# Patient Record
Sex: MALE | Race: WHITE | NOT HISPANIC OR LATINO | Employment: FULL TIME | URBAN - METROPOLITAN AREA
[De-identification: names, ages, dates, MRNs, and addresses within clinical notes are randomized per-mention and may not be internally consistent; named-entity substitution may affect disease eponyms.]

---

## 2019-11-12 NOTE — PROGRESS NOTES
"Subjective     Drew Ennis is a 34 year old male who presents to clinic today for the following health issues:    History of Present Illness        Back Pain:  He presents for follow up of back pain. Patient's back pain is a recurring problem.  Location of back pain:  Right lower back, right buttock and right hip  Description of back pain: burning, cramping, dull ache, shooting and stabbing  Back pain spreads: right buttocks, right thigh, right knee and right foot    Since patient first noticed back pain, pain is: gradually worsening  Does back pain interfere with his job:  Yes      He eats 0-1 servings of fruits and vegetables daily.He consumes 1 sweetened beverage(s) daily.  He is taking medications regularly.     Back Pain       Duration: 3 weeks. Been having \"normal back pain\" for a few months        Specific cause: none    Description:   Location of pain: low back right  Character of pain: sharp, dull ache, stabbing, burning, cramping and constant  Pain radiation:radiates into the right buttocks, radiates into the right leg and radiates into the right foot  New numbness or weakness in legs, not attributed to pain:  YES- \"alittle bit\"    Intensity: Currently 5/10, At its worst 9/10    History:   Pain interferes with job: YES  History of back problems: 5-6 years ago this happened (just not as bad) was able to get it to go away with chiropractor care.   Any previous MRI or X-rays: None  Sees a specialist for back pain:  No  Therapies tried without relief: stretch    Alleviating factors:   Improved by: acetaminophen (Tylenol), chiropractor, cold and heat      Precipitating factors:  Worsened by: Standing and Sitting        Accompanying Signs & Symptoms:  Risk of Fracture:  None  Risk of Cauda Equina:  None  Risk of Infection:  None  Risk of Cancer:  None  Risk of Ankylosing Spondylitis:  Onset at age <35, male, AND morning back stiffness. no      Patient Active Problem List   Diagnosis     Morbid obesity (H)     " History reviewed. No pertinent surgical history.    Social History     Tobacco Use     Smoking status: Never Smoker     Smokeless tobacco: Never Used   Substance Use Topics     Alcohol use: Not Currently     Family History   Problem Relation Age of Onset     Diabetes Father      Diabetes Paternal Grandfather          Current Outpatient Medications   Medication Sig Dispense Refill     cyclobenzaprine (FLEXERIL) 10 MG tablet Take 1 tablet (10 mg) by mouth 3 times daily as needed for muscle spasms 30 tablet 0     naproxen (NAPROSYN) 500 MG tablet Take 1 tablet (500 mg) by mouth 2 times daily (with meals) 30 tablet 0     No Known Allergies  No lab results found.   BP Readings from Last 3 Encounters:   11/13/19 136/86    Wt Readings from Last 3 Encounters:   11/13/19 137.5 kg (303 lb 1.6 oz)              Reviewed and updated as needed this visit by Provider         Review of Systems   ROS COMP: Constitutional, HEENT, cardiovascular, pulmonary, gi and gu systems are negative, except as otherwise noted.      Objective    There were no vitals taken for this visit.  There is no height or weight on file to calculate BMI.  Physical Exam   GENERAL: healthy, alert and no distress  RESP: lungs clear to auscultation - no rales, rhonchi or wheezes  CV: regular rate and rhythm, normal S1 S2, no S3 or S4, no murmur, click or rub, no peripheral edema and peripheral pulses strong  MS: no gross musculoskeletal defects noted, no edema  NEURO: Normal strength and tone, mentation intact and speech normal  BACK: no CVA tenderness, no paralumbar tenderness  Comprehensive back pain exam:  No tenderness, Pain limits the following motions: Forward flexion, Lower extremity strength functional and equal on both sides, Lower extremity reflexes within normal limits bilaterally, Lower extremity sensation normal and equal on both sides and Straight leg positive on  right, indicating possible ipsilateral radiculopathy  PSYCH: mentation appears normal,  affect normal/bright    Diagnostic Test Results:  Labs reviewed in Epic        ASSESSMENT and PLAN  1. Acute right-sided low back pain with right-sided sciatica  34-year-old male with acute on chronic low back pain, now with radicular symptoms on the right side.  Positive straight leg raise.  Discussed treatment options.  We will start with naproxen and Flexeril, at home exercises.  He will follow-up if no improvement.  Consider MRI and physical therapy consult at that time.  - XR Lumbar Spine 2/3 Views  - naproxen (NAPROSYN) 500 MG tablet; Take 1 tablet (500 mg) by mouth 2 times daily (with meals)  Dispense: 30 tablet; Refill: 0  - cyclobenzaprine (FLEXERIL) 10 MG tablet; Take 1 tablet (10 mg) by mouth 3 times daily as needed for muscle spasms  Dispense: 30 tablet; Refill: 0    2. Morbid obesity (H)  He is due for his annual exam, scheduled for next week.  We will address his obesity at that time.    Return in about 1 week (around 11/20/2019) for Annual Well Check.     Francisco Bello MD, FAAFP  Family Medicine Physician  [facility]  [address] [place] [address]

## 2019-11-13 ENCOUNTER — OFFICE VISIT (OUTPATIENT)
Dept: FAMILY MEDICINE | Facility: CLINIC | Age: 34
End: 2019-11-13
Payer: COMMERCIAL

## 2019-11-13 ENCOUNTER — ANCILLARY PROCEDURE (OUTPATIENT)
Dept: GENERAL RADIOLOGY | Facility: CLINIC | Age: 34
End: 2019-11-13
Attending: FAMILY MEDICINE
Payer: COMMERCIAL

## 2019-11-13 VITALS
BODY MASS INDEX: 42.43 KG/M2 | OXYGEN SATURATION: 96 % | HEIGHT: 71 IN | TEMPERATURE: 98.8 F | HEART RATE: 103 BPM | DIASTOLIC BLOOD PRESSURE: 86 MMHG | RESPIRATION RATE: 14 BRPM | WEIGHT: 303.1 LBS | SYSTOLIC BLOOD PRESSURE: 136 MMHG

## 2019-11-13 DIAGNOSIS — E66.01 MORBID OBESITY (H): ICD-10-CM

## 2019-11-13 DIAGNOSIS — M54.41 ACUTE RIGHT-SIDED LOW BACK PAIN WITH RIGHT-SIDED SCIATICA: Primary | ICD-10-CM

## 2019-11-13 PROCEDURE — 72100 X-RAY EXAM L-S SPINE 2/3 VWS: CPT | Mod: FY

## 2019-11-13 PROCEDURE — 99203 OFFICE O/P NEW LOW 30 MIN: CPT | Performed by: FAMILY MEDICINE

## 2019-11-13 RX ORDER — NAPROXEN 500 MG/1
500 TABLET ORAL 2 TIMES DAILY WITH MEALS
Qty: 30 TABLET | Refills: 0 | Status: ON HOLD | OUTPATIENT
Start: 2019-11-13 | End: 2019-12-19

## 2019-11-13 RX ORDER — CYCLOBENZAPRINE HCL 10 MG
10 TABLET ORAL 3 TIMES DAILY PRN
Qty: 30 TABLET | Refills: 0 | Status: ON HOLD | OUTPATIENT
Start: 2019-11-13 | End: 2019-12-19

## 2019-11-13 ASSESSMENT — PAIN SCALES - GENERAL: PAINLEVEL: MODERATE PAIN (5)

## 2019-11-13 ASSESSMENT — MIFFLIN-ST. JEOR: SCORE: 2354.23

## 2019-11-13 NOTE — PATIENT INSTRUCTIONS
Drew,    It was great seeing you in clinic today.  I summarized our discussion and your plan below.  Please let me know if you have any questions or concerns.  Please follow up with me as discussed in clinic or sooner if any worsening or additional concerns.     1. Acute right-sided low back pain with right-sided sciatica  34-year-old male with acute on chronic low back pain, now with radicular symptoms on the right side.  Positive straight leg raise.  Discussed treatment options.  We will start with naproxen and Flexeril, at home exercises.  He will follow-up if no improvement.  Consider MRI and physical therapy consult at that time.  - XR Lumbar Spine 2/3 Views  - naproxen (NAPROSYN) 500 MG tablet; Take 1 tablet (500 mg) by mouth 2 times daily (with meals)  Dispense: 30 tablet; Refill: 0  - cyclobenzaprine (FLEXERIL) 10 MG tablet; Take 1 tablet (10 mg) by mouth 3 times daily as needed for muscle spasms  Dispense: 30 tablet; Refill: 0    2. Morbid obesity (H)  He is due for his annual exam, scheduled for next week.  We will address his obesity at that time.       Sincerely,  Dr. SWATHI Bello MD, [facility]  Family Medicine Physician  [facility]  [id number] [place] [id number]    Patient Education      Patient Education     Exercises to Strengthen Your Lower Back  Strong lower back and abdominal muscles work together to support your spine. The exercises below will help strengthen the lower back. It is important that you begin exercising slowly and increase levels gradually.  Always begin any exercise program with stretching. If you feel pain while doing any of these exercises, stop and talk to your doctor about a more specific exercise program that better suits your condition.   Low back stretch  The point of stretching is to make you more flexible and increase your range of motion. Stretch only as much as you are able. Stretch slowly. Do not push your stretch to the limit. If at any  point you feel pain while stretching, this is your (temporary) limit.    Lie on your back with your knees bent and both feet on the ground.    Slowly raise your left knee to your chest as you flatten your lower back against the floor. Hold for 5 seconds.    Relax and repeat the exercise with your right knee.    Do 10 of these exercises for each leg.    Repeat hugging both knees to your chest at the same time.  Building lower back strength  Start your exercise routine with 10 to 30 minutes a day, 1 to 3 times a day.  Initial exercises  Lying on your back:  1. Ankle pumps: Move your foot up and down, towards your head, and then away. Repeat 10 times with each foot.  2. Heel slides: Slowly bend your knee, drawing the heel of your foot towards you. Then slide your heel/foot from you, straightening your knee. Do not lift your foot off the floor (this is not a leg lift).  3. Abdominal contraction: Bend your knees and put your hands on your stomach. Tighten your stomach muscles. Hold for 5 seconds, then relax. Repeat 10 times.  4. Straight leg raise: Bend one leg at the knee and keep the other leg straight. Tighten your stomach muscles. Slowly lift your straight leg 6 to 12 inches off the floor and hold for up to 5 seconds. Repeat 10 times on each side.  Standin. Wall squats: Stand with your back against the wall. Move your feet about 12 inches away from the wall. Tighten your stomach muscles, and slowly bend your knees until they are at about a 45 degree angle. Do not go down too far. Hold about 5 seconds. Then slowly return to your starting position. Repeat 10 times.  2. Heel raises: Stand facing the wall. Slowly raise the heels of your feet up and down, while keeping your toes on the floor. If you have trouble balancing, you can touch the wall with your hands. Repeat 10 times.  More advanced exercises  When you feel comfortable enough, try these exercises.  1. Kneeling lumbar extension: Begin on your hands and  knees. At the same time, raise and straighten your right arm and left leg until they are parallel to the ground. Hold for 2 seconds and come back slowly to a starting position. Repeat with left arm and right leg, alternating 10 times.  2. Prone lumbar extension: Lie face down, arms extended overhead, palms on the floor. At the same time, raise your right arm and left leg as high as comfortably possible. Hold for 10 seconds and slowly return to start. Repeat with left arm and right leg, alternating 10 times. Gradually build up to 20 times. (Advanced: Repeat this exercise raising both arms and both legs a few inches off the floor at the same time. Hold for 5 seconds and release.)  3. Pelvic tilt: Lie on the floor on your back with your knees bent at 90 degrees. Your feet should be flat on the floor. Inhale, exhale, then slowly contract your abdominal muscles bringing your navel toward your spine. Let your pelvis rock back until your lower back is flat on the floor. Hold for 10 seconds while breathing smoothly.  4. Abdominal crunch: Perform a pelvic tilt (above) flattening your lower back against the floor. Holding the tension in your abdominal muscles, take another breath and raise your shoulder blades off the ground (this is not a full sit-up). Keep your head in line with your body (don t bend your neck forward). Hold for 2 seconds, then slowly lower.  Date Last Reviewed: 6/1/2016 2000-2018 [employer]. [address] [place] [address]. All rights reserved. This information is not intended as a substitute for professional medical care. Always follow your healthcare professional's instructions.           Patient Education     Possible Causes of Low Back or Leg Pain    BIG: The symptoms in your back or leg may be due to pressure on a nerve. This pressure may be caused by a damaged disk or by abnormal bone growth. Either way, you may feel pain, burning, tingling, or numbness. If you have  pressure on a nerve that connects to the sciatic nerve, pain may shoot down your leg.    Pressure from the disk  Constant wear and tear can weaken a disk over time and cause back pain. The disk can then be damaged by a sudden movement or injury. If its soft center starts to bulge, the disk may press on a nerve. Or the outside of the disk may tear, and the soft center may squeeze through and pinch a nerve.    Pressure from bone  As a disk wears out, the vertebrae right above and below the disk start to touch. This can put pressure on a nerve. Often, abnormal bone (called bone spurs) grows where the vertebrae rub against each other. This can cause the foramen or the spinal canal to narrow (called stenosis) and press against a nerve.  Date Last Reviewed: 3/1/2018    9226-4069 [employer]. [address] [place] [address]. All rights reserved. This information is not intended as a substitute for professional medical care. Always follow your healthcare professional's instructions.           Patient Education     Back Exercises: Abdominal Lift Brace with Marching    The abdominal lift brace with march strengthens your lower abdominal muscles, helping you keep your pelvis and back stable:    Lie on the floor with both knees bent. Put your feet flat on the floor and your arms by your sides. Tighten your abdominal muscles. Be sure to continue to breathe.    Lift one bent knee about 2 inches then return it to the floor and lift the other about 2 inches. Keep your abdominal muscles tight and continue to breathe. These motions should be slow and controlled without your pelvis rocking side to side.    Repeat 10 times.  Date Last Reviewed: 3/1/2018    7691-5976 [employer]. [address] [place] [address]. All rights reserved. This information is not intended as a substitute for professional medical care. Always follow your healthcare professional's instructions.           Patient  Education     Back Exercises: Arm Reach    Do this exercise on your hands and knees. Keep your knees under your hips and your hands under your shoulders. Keep your spine in a neutral position (not arched or sagging). Be sure to maintain your neck s natural curve:    Stretch one arm straight out in front of you. Don t raise your head or let your supporting shoulder sag.    Hold for 5 seconds.    Return to starting position.    Repeat 5 times.    Switch arms.  Date Last Reviewed: 3/1/2018    0122-4889 [employer]. [address] [place] [address]. All rights reserved. This information is not intended as a substitute for professional medical care. Always follow your healthcare professional's instructions.           Patient Education     Back Exercises: Back Release  Do this exercise on your hands and knees. Keep your knees under your hips and your hands under your shoulders.        Relax your abdominal and buttocks muscles, lift your head, and let your back sag. Be sure to keep your weight evenly distributed. Don t sit back on your hips.     Hold for 5 seconds.    Return to starting position.    Tuck your head and lift (arch) your back.    Hold for 5 seconds    Return to starting position.    Repeat 5 times.  Date Last Reviewed: 3/1/2018    0651-9866 [employer]. [address] [place] [address]. All rights reserved. This information is not intended as a substitute for professional medical care. Always follow your healthcare professional's instructions.

## 2019-11-18 ENCOUNTER — TELEPHONE (OUTPATIENT)
Dept: FAMILY MEDICINE | Facility: CLINIC | Age: 34
End: 2019-11-18

## 2019-11-18 DIAGNOSIS — M54.41 ACUTE RIGHT-SIDED LOW BACK PAIN WITH RIGHT-SIDED SCIATICA: Primary | ICD-10-CM

## 2019-11-18 RX ORDER — GABAPENTIN 300 MG/1
300 CAPSULE ORAL 2 TIMES DAILY
Qty: 30 CAPSULE | Refills: 0 | Status: SHIPPED | OUTPATIENT
Start: 2019-11-18 | End: 2019-12-02

## 2019-11-18 NOTE — LETTER
[facility]  [address]  [place] [address]  [phone]      November 22, 2019    Drew Ennis                                                                                                                     [address] [place] [address]      Dear Drew,    We have attempted to contact you by telephone without success.  Your Provider has a message for you:    Please advise patient that I am placing prescription for gabapentin.  This is a medication specific to nerve pain.  It can make him a little sleepy so ensure no driving while taking the medication. Also I have ordered an MRI to evaluate his lower back.  This can be scheduled by calling the [facility] in [place] [phone].  Please follow up with a clinic office visit with Dr. Bello after that.   We will consider physical therapy versus surgical options at that time.     Francisco Bello MD, FAAFP          Sincerely,     [facility] Support Staff / [place]

## 2019-11-18 NOTE — TELEPHONE ENCOUNTER
Reason for Call:  Medication or medication refill:    Do you use a [facility]?  Name of the pharmacy and phone number for the current request:  [email or web address]    Name of the medication requested: cyclobenzaprine    Other request: this is not working. Was told to call to discuss alternative.    Can we leave a detailed message on this number? YES    Phone number patient can be reached at: Home number on file [phone] (home)    Best Time: any    Call taken on 11/18/2019 at 10:16 AM by Karlie Lopez

## 2019-11-18 NOTE — TELEPHONE ENCOUNTER
Please advise patient that I am placing prescription for gabapentin.  This is a medication specific to nerve pain.  It can make him a little sleepy so ensure no driving while taking the medication.  Also I have ordered an MRI to evaluate his lower back, please assist with scheduling that.  Have him follow-up with me in clinic after that.  We will consider physical therapy versus surgical options at that time.    Francisco Bello MD, FAAFP  Family Medicine Physician  [facility]  [address] [place] [address]

## 2019-11-18 NOTE — TELEPHONE ENCOUNTER
"tried to call pt back.  Phone number states \" thank you for calling [place].  This number has not yet been assigned.\"    When pt calls back, please get working phone number and inform them of message below.  "

## 2019-11-19 ENCOUNTER — OFFICE VISIT (OUTPATIENT)
Dept: FAMILY MEDICINE | Facility: CLINIC | Age: 34
End: 2019-11-19
Payer: COMMERCIAL

## 2019-11-19 VITALS
SYSTOLIC BLOOD PRESSURE: 130 MMHG | HEIGHT: 71 IN | BODY MASS INDEX: 43.12 KG/M2 | WEIGHT: 308 LBS | OXYGEN SATURATION: 98 % | HEART RATE: 104 BPM | DIASTOLIC BLOOD PRESSURE: 74 MMHG | TEMPERATURE: 99 F | RESPIRATION RATE: 16 BRPM

## 2019-11-19 DIAGNOSIS — M54.41 ACUTE RIGHT-SIDED LOW BACK PAIN WITH RIGHT-SIDED SCIATICA: Primary | ICD-10-CM

## 2019-11-19 DIAGNOSIS — Z23 NEED FOR PROPHYLACTIC VACCINATION AND INOCULATION AGAINST INFLUENZA: ICD-10-CM

## 2019-11-19 PROCEDURE — 99214 OFFICE O/P EST MOD 30 MIN: CPT | Mod: 25 | Performed by: FAMILY MEDICINE

## 2019-11-19 PROCEDURE — 90686 IIV4 VACC NO PRSV 0.5 ML IM: CPT | Performed by: FAMILY MEDICINE

## 2019-11-19 PROCEDURE — 90471 IMMUNIZATION ADMIN: CPT | Performed by: FAMILY MEDICINE

## 2019-11-19 RX ORDER — METHYLPREDNISOLONE 4 MG
TABLET, DOSE PACK ORAL
Qty: 21 TABLET | Refills: 0 | Status: ON HOLD | OUTPATIENT
Start: 2019-11-19 | End: 2019-12-19

## 2019-11-19 ASSESSMENT — PAIN SCALES - GENERAL: PAINLEVEL: SEVERE PAIN (7)

## 2019-11-19 ASSESSMENT — MIFFLIN-ST. JEOR: SCORE: 2376.51

## 2019-11-19 NOTE — PROGRESS NOTES
Subjective     Drew Ennis is a 34 year old male who presents to clinic today for the following health issues:    HPI   Back Pain       Duration: 1 month now         Specific cause: none    Description:   Location of pain: low back right  Character of pain: sharp, stabbing, burning and constant  Pain radiation:radiates into the right leg and radiates into the right foot  New numbness or weakness in legs, not attributed to pain:  YES- numbness in the right foot     Intensity: Currently 7/10    History:   Pain interferes with job: YES  History of back problems: no prior back problems  Any previous MRI or X-rays: Yes- at [facility].  Date 11/13/19 at the last office visit   Sees a specialist for back pain:  No  Therapies tried without relief: muscle relaxants, gabapentin, some help with physical therapy    Alleviating factors:   Improved by: none       Precipitating factors:  Worsened by: Sitting and Lying Flat        Accompanying Signs & Symptoms:  Risk of Fracture:  None  Risk of Cauda Equina:  None  Risk of Infection:  None  Risk of Cancer:  None  Risk of Ankylosing Spondylitis:  Onset at age <35, male, AND morning back stiffness. no          Patient Active Problem List   Diagnosis     Morbid obesity (H)     History reviewed. No pertinent surgical history.    Social History     Tobacco Use     Smoking status: Never Smoker     Smokeless tobacco: Never Used   Substance Use Topics     Alcohol use: Not Currently     Family History   Problem Relation Age of Onset     Diabetes Father      Diabetes Paternal Grandfather          Current Outpatient Medications   Medication Sig Dispense Refill     cyclobenzaprine (FLEXERIL) 10 MG tablet Take 1 tablet (10 mg) by mouth 3 times daily as needed for muscle spasms 30 tablet 0     gabapentin (NEURONTIN) 300 MG capsule Take 1 capsule (300 mg) by mouth 2 times daily 30 capsule 0     methylPREDNISolone (MEDROL DOSEPAK) 4 MG tablet therapy pack Follow Package Directions 21 tablet 0      "naproxen (NAPROSYN) 500 MG tablet Take 1 tablet (500 mg) by mouth 2 times daily (with meals) 30 tablet 0     No Known Allergies  No lab results found.   BP Readings from Last 3 Encounters:   11/19/19 130/74   11/13/19 136/86    Wt Readings from Last 3 Encounters:   11/19/19 139.7 kg (308 lb)   11/13/19 137.5 kg (303 lb 1.6 oz)                    Reviewed and updated as needed this visit by Provider         Review of Systems   ROS COMP: Constitutional, HEENT, cardiovascular, pulmonary, gi and gu systems are negative, except as otherwise noted.      Objective    /74   Pulse 104   Temp 99  F (37.2  C) (Oral)   Resp 16   Ht 1.815 m (5' 11.46\")   Wt 139.7 kg (308 lb)   SpO2 98%   BMI 42.41 kg/m    Body mass index is 42.41 kg/m .  Physical Exam   GENERAL: healthy, alert and no distress  RESP: lungs clear to auscultation - no rales, rhonchi or wheezes  CV: regular rate and rhythm, normal S1 S2, no S3 or S4, no murmur, click or rub, no peripheral edema and peripheral pulses strong  MS: no gross musculoskeletal defects noted, no edema  Comprehensive back pain exam:  No tenderness, Pain limits the following motions: flexion, Lower extremity strength functional and equal on both sides, Lower extremity reflexes within normal limits bilaterally, Lower extremity sensation normal and equal on both sides and Straight leg positive on  right, indicating possible ipsilateral radiculopathy    Diagnostic Test Results:  Labs reviewed in Epic        ASSESSMENT and PLAN  1. Acute right-sided low back pain with right-sided sciatica  34-year-old male ongoing work-up for right-sided low back pain with right-sided sciatic symptoms.  He had no improvement with Flexeril.  Previously switch that to gabapentin, he has not picked that up yet.  Family member is a physical therapist who worked on him recently, recommend a Medrol Dosepak.  We discussed limitations of Medrol Dosepak and treatment of neuropathic pain, patient requested " Dosepak.  He will hold gabapentin until after that.  - methylPREDNISolone (MEDROL DOSEPAK) 4 MG tablet therapy pack; Follow Package Directions  Dispense: 21 tablet; Refill: 0    2. Need for prophylactic vaccination and inoculation against influenza  Due for influenza, conducted today.  - INFLUENZA VACCINE IM > 6 MONTHS VALENT IIV4 [49395]  - Vaccine Administration, Initial [39948]    No follow-ups on file.     Francisco Bello MD, FAAFP  Family Medicine Physician  [facility]  [address] [place] [address]

## 2019-11-19 NOTE — PATIENT INSTRUCTIONS
Drew,    It was great seeing you in clinic today.  I summarized our discussion and your plan below.  Please let me know if you have any questions or concerns.  Please follow up with me as discussed in clinic or sooner if any worsening or additional concerns.     1. Acute right-sided low back pain with right-sided sciatica  34-year-old male ongoing work-up for right-sided low back pain with right-sided sciatic symptoms.  He had no improvement with Flexeril.  Previously switch that to gabapentin, he has not picked that up yet.  Family member is a physical therapist who worked on him recently, recommend a Medrol Dosepak.  We discussed limitations of Medrol Dosepak and treatment of neuropathic pain, patient requested Dosepak.  He will hold gabapentin until after that.  - methylPREDNISolone (MEDROL DOSEPAK) 4 MG tablet therapy pack; Follow Package Directions  Dispense: 21 tablet; Refill: 0    2. Need for prophylactic vaccination and inoculation against influenza  Due for influenza, conducted today.  - INFLUENZA VACCINE IM > 6 MONTHS VALENT IIV4 [77325]  - Vaccine Administration, Initial [07297]       Sincerely,  Dr. SWATHI Bello MD, FAAFP  Family Medicine Physician  [facility]  [address] [place] [address]    Patient Education

## 2019-11-19 NOTE — TELEPHONE ENCOUNTER
"I tried calling patient today. Same as previous notes. When calling number on file it states \"thank you for calling [place] you have reached a number that has not been assigned please check your number and try again.\"    When patient calls in please advised message below and update patients phone number.    Sedrick Ayala MA on 11/19/2019 at 12:05 PM    "

## 2019-11-20 NOTE — TELEPHONE ENCOUNTER
Called pharmacy, pt number is [phone], updated chart. Tried to call pt but VM is full. If he calls back please give message below.     Roro Masters RN, BSN

## 2019-11-26 NOTE — PROGRESS NOTES
SUBJECTIVE:   CC: Drew Ennis is an 34 year old male who presents for preventative health visit.     Healthy Habits:     Getting at least 3 servings of Calcium per day:  NO    Bi-annual eye exam:  Yes    Dental care twice a year:  Yes    Sleep apnea or symptoms of sleep apnea:  None    Diet:  Regular (no restrictions)    Frequency of exercise:  2-3 days/week    Duration of exercise:  Less than 15 minutes    Taking medications regularly:  Yes    Medication side effects:  None    PHQ-2 Total Score: 0    Additional concerns today:  No          Chronic/Recurring Back Pain Follow Up      Where is your back pain located? (Select all that apply) low back bilateral    How would you describe your back pain?  burning, cramping and aching is the most common and gabapentin help with this    Where does your back pain spread? the right and left buttock, the right and left  thigh and the right and left foot    Since your last clinic visit for back pain, how has your pain changed? always present, but gets better and worse    Does your back pain interfere with your job? YES    Since your last visit, have you tried any new treatment? Yes -  stretching      Today's PHQ-2 Score:   PHQ-2 ( 1999 [employer]) 11/13/2019   Q1: Little interest or pleasure in doing things 0   Q2: Feeling down, depressed or hopeless 0   PHQ-2 Score 0   Q1: Little interest or pleasure in doing things Not at all   Q2: Feeling down, depressed or hopeless Not at all   PHQ-2 Score 0       Abuse: Current or Past(Physical, Sexual or Emotional)- No  Do you feel safe in your environment? Yes        Social History     Tobacco Use     Smoking status: Never Smoker     Smokeless tobacco: Never Used   Substance Use Topics     Alcohol use: Not Currently     If you drink alcohol do you typically have >3 drinks per day or >7 drinks per week? No    No flowsheet data found.    Last PSA: No results found for: PSA    Reviewed orders with patient. Reviewed health maintenance and  updated orders accordingly - Yes  Labs reviewed in EPIC  BP Readings from Last 3 Encounters:   12/02/19 132/82   11/19/19 130/74   11/13/19 136/86    Wt Readings from Last 3 Encounters:   12/02/19 135.5 kg (298 lb 11.2 oz)   11/19/19 139.7 kg (308 lb)   11/13/19 137.5 kg (303 lb 1.6 oz)                  Patient Active Problem List   Diagnosis     Morbid obesity (H)     History reviewed. No pertinent surgical history.    Social History     Tobacco Use     Smoking status: Never Smoker     Smokeless tobacco: Never Used   Substance Use Topics     Alcohol use: Not Currently     Family History   Problem Relation Age of Onset     Diabetes Father      Diabetes Paternal Grandfather          Current Outpatient Medications   Medication Sig Dispense Refill     cyclobenzaprine (FLEXERIL) 10 MG tablet Take 1 tablet (10 mg) by mouth 3 times daily as needed for muscle spasms 30 tablet 0     gabapentin (NEURONTIN) 300 MG capsule Take 1 capsule (300 mg) by mouth 3 times daily 60 capsule 1     methylPREDNISolone (MEDROL DOSEPAK) 4 MG tablet therapy pack Follow Package Directions 21 tablet 0     naproxen (NAPROSYN) 500 MG tablet Take 1 tablet (500 mg) by mouth 2 times daily (with meals) 30 tablet 0     No Known Allergies  No lab results found.     Reviewed and updated as needed this visit by clinical staff         Reviewed and updated as needed this visit by Provider        History reviewed. No pertinent past medical history.     Review of Systems   Constitutional: Negative for chills and fever.   HENT: Negative for congestion, ear pain, hearing loss and sore throat.    Eyes: Negative for pain and visual disturbance.   Respiratory: Negative for cough and shortness of breath.    Cardiovascular: Negative for chest pain, palpitations and peripheral edema.   Gastrointestinal: Negative for abdominal pain, constipation, diarrhea, heartburn, hematochezia and nausea.   Genitourinary: Negative for discharge, dysuria, frequency, genital sores,  hematuria, impotence and urgency.   Musculoskeletal: Negative for arthralgias, joint swelling and myalgias.   Skin: Negative for rash.   Neurological: Positive for headaches. Negative for dizziness, weakness and paresthesias.   Psychiatric/Behavioral: Negative for mood changes. The patient is not nervous/anxious.          OBJECTIVE:   There were no vitals taken for this visit.    Physical Exam  GENERAL: alert, no distress and obese  EYES: Eyes grossly normal to inspection, PERRL and conjunctivae and sclerae normal  HENT: ear canals and TM's normal, nose and mouth without ulcers or lesions  NECK: no adenopathy, no asymmetry, masses, or scars and thyroid normal to palpation  RESP: lungs clear to auscultation - no rales, rhonchi or wheezes  CV: regular rate and rhythm, normal S1 S2, no S3 or S4, no murmur, click or rub, no peripheral edema and peripheral pulses strong  ABDOMEN: soft, nontender, no hepatosplenomegaly, no masses and bowel sounds normal   (male): normal male genitalia without lesions or urethral discharge, no hernia  MS: no gross musculoskeletal defects noted, no edema  SKIN: no suspicious lesions or rashes  NEURO: Normal strength and tone, mentation intact and speech normal  PSYCH: mentation appears normal, affect normal/bright  Slowed range of motion secondary to right-sided low back pain.  Positive straight leg raise.  No significant worsening from last exam    Diagnostic Test Results:  Labs reviewed in Epic    ASSESSMENT/PLAN:   ASSESSMENT and PLAN  1. Routine general medical examination at a health care facility  34-year-old male here for an annual well exam.  We discussed current routine cancer screening guidelines.  Conducted a skin exam in clinic.  See below for other issues addressed.  Also getting routine labs today.  - HIV Screening  - Lipid panel reflex to direct LDL Non-fasting  - Glucose    2. Acute right-sided low back pain with right-sided sciatica  History of right-sided low back pain  "with radicular symptoms.  MRI showed some encroachment on the L5 nerve root.  He was referred for physical therapy, as appointment is in 4 days.  He has had some improvement with the gabapentin, recommend increasing up to 3 times daily.  Follow-up after physical therapy.    3. Morbid obesity (H)  BMI is currently 42.  We discussed the importance of weight loss, not only for general health, but also for improvement in his back pain.  Placing referral to nutrition today.  - Nutrition         Return in about 1 year (around 2020) for Annual Well Check.     Francisco Bello MD, FAAFP  Family Medicine Physician  [facility] [place]  [address] [place] [address]        COUNSELING:   Reviewed preventive health counseling, as reflected in patient instructions       Regular exercise       Healthy diet/nutrition       HIV screeninx in teen years, 1x in adult years, and at intervals if high risk       Colon cancer screening       Prostate cancer screening    Estimated body mass index is 42.41 kg/m  as calculated from the following:    Height as of 19: 1.815 m (5' 11.46\").    Weight as of 19: 139.7 kg (308 lb).     Weight management plan: Discussed healthy diet and exercise guidelines Nutrition referral     reports that he has never smoked. He has never used smokeless tobacco.      Counseling Resources:  ATP IV Guidelines  Pooled Cohorts Equation Calculator  FRAX Risk Assessment  ICSI Preventive Guidelines  Dietary Guidelines for Americans,   USDA's MyPlate  ASA Prophylaxis  Lung CA Screening    Francisco Bello MD  [facility]  "

## 2019-11-26 NOTE — PATIENT INSTRUCTIONS
Drew,    It was great seeing you in clinic today.  I summarized our discussion and your plan below.  Please let me know if you have any questions or concerns.  Please follow up with me as discussed in clinic or sooner if any worsening or additional concerns.     1. Routine general medical examination at a health care facility  34-year-old male here for an annual well exam.  We discussed current routine cancer screening guidelines.  Conducted a skin exam in clinic.  See below for other issues addressed.  Also getting routine labs today.  - HIV Screening  - Lipid panel reflex to direct LDL Non-fasting  - Glucose    2. Acute right-sided low back pain with right-sided sciatica  History of right-sided low back pain with radicular symptoms.  MRI showed some encroachment on the L5 nerve root.  He was referred for physical therapy, as appointment is in 4 days.  He has had some improvement with the gabapentin, recommend increasing up to 3 times daily.  Follow-up after physical therapy.    3. Morbid obesity (H)  BMI is currently 42.  We discussed the importance of weight loss, not only for general health, but also for improvement in his back pain.  Placing referral to nutrition today.  - Nutrition       Sincerely,  Dr. SWATHI Bello MD, FAAFP  Family Medicine Physician  [facility]  [id number] [place] [id number]    Patient Education      Preventive Health Recommendations  Male Ages 26 - 39    Yearly exam:             See your health care provider every year in order to  o   Review health changes.   o   Discuss preventive care.    o   Review your medicines if your doctor has prescribed any.    You should be tested each year for STDs (sexually transmitted diseases), if you re at risk.     After age 35, talk to your provider about cholesterol testing. If you are at risk for heart disease, have your cholesterol tested at least every 5 years.     If you are at risk for diabetes, you should have a  diabetes test (fasting glucose).  Shots: Get a flu shot each year. Get a tetanus shot every 10 years.     Nutrition:    Eat at least 5 servings of fruits and vegetables daily.     Eat whole-grain bread, whole-wheat pasta and brown rice instead of white grains and rice.     Get adequate Calcium and Vitamin D.     Lifestyle    Exercise for at least 150 minutes a week (30 minutes a day, 5 days a week). This will help you control your weight and prevent disease.     Limit alcohol to one drink per day.     No smoking.     Wear sunscreen to prevent skin cancer.     See your dentist every six months for an exam and cleaning.

## 2019-11-30 ENCOUNTER — ANCILLARY PROCEDURE (OUTPATIENT)
Dept: MRI IMAGING | Facility: CLINIC | Age: 34
End: 2019-11-30
Attending: FAMILY MEDICINE
Payer: COMMERCIAL

## 2019-11-30 DIAGNOSIS — M54.41 ACUTE RIGHT-SIDED LOW BACK PAIN WITH RIGHT-SIDED SCIATICA: ICD-10-CM

## 2019-11-30 PROCEDURE — 72148 MRI LUMBAR SPINE W/O DYE: CPT | Performed by: RADIOLOGY

## 2019-12-02 ENCOUNTER — OFFICE VISIT (OUTPATIENT)
Dept: FAMILY MEDICINE | Facility: CLINIC | Age: 34
End: 2019-12-02
Payer: COMMERCIAL

## 2019-12-02 VITALS
WEIGHT: 298.7 LBS | HEART RATE: 108 BPM | OXYGEN SATURATION: 97 % | DIASTOLIC BLOOD PRESSURE: 82 MMHG | SYSTOLIC BLOOD PRESSURE: 132 MMHG | TEMPERATURE: 99.1 F | RESPIRATION RATE: 12 BRPM | BODY MASS INDEX: 41.82 KG/M2 | HEIGHT: 71 IN

## 2019-12-02 DIAGNOSIS — M54.41 ACUTE RIGHT-SIDED LOW BACK PAIN WITH RIGHT-SIDED SCIATICA: ICD-10-CM

## 2019-12-02 DIAGNOSIS — M54.41 ACUTE RIGHT-SIDED LOW BACK PAIN WITH RIGHT-SIDED SCIATICA: Primary | ICD-10-CM

## 2019-12-02 DIAGNOSIS — Z00.00 ROUTINE GENERAL MEDICAL EXAMINATION AT A HEALTH CARE FACILITY: Primary | ICD-10-CM

## 2019-12-02 DIAGNOSIS — E66.01 MORBID OBESITY (H): ICD-10-CM

## 2019-12-02 PROCEDURE — 87389 HIV-1 AG W/HIV-1&-2 AB AG IA: CPT | Performed by: FAMILY MEDICINE

## 2019-12-02 PROCEDURE — 80061 LIPID PANEL: CPT | Performed by: FAMILY MEDICINE

## 2019-12-02 PROCEDURE — 36415 COLL VENOUS BLD VENIPUNCTURE: CPT | Performed by: FAMILY MEDICINE

## 2019-12-02 PROCEDURE — 99395 PREV VISIT EST AGE 18-39: CPT | Performed by: FAMILY MEDICINE

## 2019-12-02 PROCEDURE — 82947 ASSAY GLUCOSE BLOOD QUANT: CPT | Performed by: FAMILY MEDICINE

## 2019-12-02 PROCEDURE — 99214 OFFICE O/P EST MOD 30 MIN: CPT | Mod: 25 | Performed by: FAMILY MEDICINE

## 2019-12-02 RX ORDER — GABAPENTIN 300 MG/1
300 CAPSULE ORAL 3 TIMES DAILY
Qty: 60 CAPSULE | Refills: 1 | Status: SHIPPED | OUTPATIENT
Start: 2019-12-02 | End: 2020-01-20

## 2019-12-02 ASSESSMENT — ENCOUNTER SYMPTOMS
DIZZINESS: 0
SORE THROAT: 0
WEAKNESS: 0
ARTHRALGIAS: 0
NAUSEA: 0
MYALGIAS: 0
PALPITATIONS: 0
DYSURIA: 0
CHILLS: 0
HEADACHES: 1
FEVER: 0
FREQUENCY: 0
CONSTIPATION: 0
JOINT SWELLING: 0
HEARTBURN: 0
PARESTHESIAS: 0
HEMATURIA: 0
NERVOUS/ANXIOUS: 0
HEMATOCHEZIA: 0
DIARRHEA: 0
COUGH: 0
ABDOMINAL PAIN: 0
SHORTNESS OF BREATH: 0
EYE PAIN: 0

## 2019-12-02 ASSESSMENT — MIFFLIN-ST. JEOR: SCORE: 2321.76

## 2019-12-02 ASSESSMENT — PAIN SCALES - GENERAL: PAINLEVEL: SEVERE PAIN (7)

## 2019-12-02 NOTE — RESULT ENCOUNTER NOTE
Please advise patient that his MRI did show some low back arthritis, and a bulging disc that could be causing his symptoms.  I am placing a consult to physical therapy.  He should schedule initial appointment today.  Follow-up if no improvement or any worsening for weeks, we will consider referral to surgeon at that time.  Dr. SWATHI Bello MD, FAAFP  Family Medicine Physician  [facility] [place]  [address] [place] [address]

## 2019-12-03 LAB
CHOLEST SERPL-MCNC: 239 MG/DL
GLUCOSE SERPL-MCNC: 90 MG/DL (ref 70–99)
HDLC SERPL-MCNC: 38 MG/DL
LDLC SERPL CALC-MCNC: 153 MG/DL
NONHDLC SERPL-MCNC: 201 MG/DL
TRIGL SERPL-MCNC: 242 MG/DL

## 2019-12-04 ENCOUNTER — E-VISIT (OUTPATIENT)
Dept: FAMILY MEDICINE | Facility: CLINIC | Age: 34
End: 2019-12-04
Payer: COMMERCIAL

## 2019-12-04 ENCOUNTER — TELEPHONE (OUTPATIENT)
Dept: FAMILY MEDICINE | Facility: CLINIC | Age: 34
End: 2019-12-04

## 2019-12-04 DIAGNOSIS — M54.41 ACUTE RIGHT-SIDED LOW BACK PAIN WITH RIGHT-SIDED SCIATICA: Primary | ICD-10-CM

## 2019-12-04 LAB — HIV 1+2 AB+HIV1 P24 AG SERPL QL IA: NONREACTIVE

## 2019-12-04 PROCEDURE — 99207 ZZC NO BILLABLE SERVICE THIS VISIT: CPT | Performed by: FAMILY MEDICINE

## 2019-12-04 RX ORDER — PREDNISONE 50 MG/1
50 TABLET ORAL DAILY
Qty: 5 TABLET | Refills: 0 | Status: SHIPPED | OUTPATIENT
Start: 2019-12-04 | End: 2019-12-20

## 2019-12-04 NOTE — PATIENT INSTRUCTIONS
Thank you for choosing us for your care. I have placed an order for a prescription so that you can start treatment. View your full visit summary for details by clicking on the link below. Your pharmacist will able to address any questions you may have about the medication.      If you're not feeling better within 2-3 weeks please schedule an appointment.  You can [facility] appointment right [facility], or call [phone]  If the visit is for the same symptoms as your e-visit, we'll refund the cost of your e-visit if seen within seven days.    Keep physical therapy appointment

## 2019-12-04 NOTE — TELEPHONE ENCOUNTER
Please advise patient that I filled out a work restriction letter for him.  He can pick that up at our .  I placed that form in the 's inbox.    Francisco Bello MD, FAAFP  Family Medicine Physician  [facility] [place]  [address] [facility] [place] [address]

## 2019-12-04 NOTE — RESULT ENCOUNTER NOTE
Drew,  Your recent studies showed a normal blood glucose and a negative HIV.  Your cholesterol was elevated above normal thresholds.  Recommend immediate dietary changes to include a diet of lean meat and rich in fruits and vegetables.  You may also consider fiber and omega 3 fatty acid supplementation.  You may require a new medication or an increase in your existing medication.  Please make an appointment to see me if you would like to discuss this further.  Please let me know if you have any questions or concerns and follow up as discussed in clinic.    Sincerely.  Dr. SWATHI Bello MD, FAAFP  Family Medicine Physician  [facility] [place]  [address] [place] [address]

## 2019-12-04 NOTE — LETTER
December 4, 2019      Drew Ennis  [address] [place] [address]        To Whom It May Concern:    Drew Ennis was seen in our clinic. He may return to work with the following: limited to light duty - lifting no greater than 20 pounds, no use of arms over shoulders, no bending/stooping, no climbing, standing limited to 0.5 hrs and walking limited to 0.5 hrs. Continue these work restrictions for the next 2 weeks.      Sincerely,        Francisco Bello MD

## 2019-12-06 ENCOUNTER — THERAPY VISIT (OUTPATIENT)
Dept: PHYSICAL THERAPY | Facility: CLINIC | Age: 34
End: 2019-12-06
Attending: FAMILY MEDICINE
Payer: COMMERCIAL

## 2019-12-06 DIAGNOSIS — M54.41 ACUTE RIGHT-SIDED LOW BACK PAIN WITH RIGHT-SIDED SCIATICA: ICD-10-CM

## 2019-12-06 PROCEDURE — 97110 THERAPEUTIC EXERCISES: CPT | Mod: GP | Performed by: PHYSICAL THERAPIST

## 2019-12-06 PROCEDURE — 97161 PT EVAL LOW COMPLEX 20 MIN: CPT | Mod: GP | Performed by: PHYSICAL THERAPIST

## 2019-12-06 NOTE — PROGRESS NOTES
[facility] Initial Evaluation  Subjective:    Drew Ennis being seen for Sciatica pain from bulging disc.   Problem began 9/1/2019. Where condition occurred: for unknown reasons.Problem occurred: Unsure  and reported as 8/10 on pain scale. General health as reported by patient is good. Pertinent medical history includes:  Calf pain-swelling-warmth, high blood pressure, numbness/tingling, overweight and pain at night/rest.      Current medications:  Anti-inflammatory.   Primary job tasks include:  Computer work, driving, lifting/carrying, prolonged standing and repetitive tasks.  Pain is described as aching, burning, cramping, sharp and stabbing and is constant. Pain is worse during the night, worse in the A.M. and worse in the P.M.. Since onset symptoms are gradually worsening. Special tests:  MRI. Previous treatment includes chiropractic. There was mild improvement following previous treatment.    Restrictions include:  Working in normal job with restrictions.    Barriers include:  None as reported by patient.    Type of problem:  Lumbar   Occurance: has had issue with low back pain on off for several years but nothing like this. Was getting into his car and felt a pop and intially had localized low back pain but then in bed got anther pop and started radiated. This is a new condition   Problem details: 9/1/19.   Patient reports pain:  Lumbar spine right. Radiates to:  Lower leg right, knee right, thigh right, gluteals right and foot right. Associated symptoms:  Numbness, tingling and loss of strength (numbness tingling in foot, difficulty lifting his big toe). Symptoms are exacerbated by bending and lying down (sitting in a chair / car) Relieved by: standing and walking, ice.                       Objective:  System         Lumbar/SI Evaluation    Lumbar Myotomes:    T12-L3 (Hip Flex):  Left: 5    Right: 4  L2-4 (Quads):  Left:  5    Right:  4  L4 (Ankle DF):  Left:  5    Right:  4  L5  (Great Toe Ext): Left: 5    Right: 4   S1 (Toe Raise):  Left: 5    Right: 3+  Lumbar DTR's:    L4 (Quad):  Left:  2   Right:  2  S1 (Achilles):  Left:  3   Right:  2    Lumbar Dermtomes:      L1 Left:  Normal-light touch     L1 Right:  Normal-light touch  L2 Left:  Normal-light touch     L2 Right:  Normal-light touch  L3 Left:  Normal-light touch     L3 Right:  Normal-light touch  L4 Left:  Normal-light touch       L4 Right:  Hypo-light touch  L5 Left:  Normal-light touch     L5 Right:  Hypo-light touch  S1 Left:  Normal-light touch     S1 Right:  Hypo-light touch  Neural Tension/Mobility:      Left side:SLR or Slump  negative.   Right side:   Slump and SLR positive.                                                         [place] Lumbar Evaluation      Movement Loss:  Flexion (Flex): min and pain  Extension (EXT): min and pain  Side [place] R (SG R): nil  Side Glide L (SG L): nil and pain  Test Movements:  FIS: During: decreases  After: no better  Pretest Movements: 7/10 PL low back at knee and right calf.   Repeat FIS: During: increases  After: no worse        EIL: During: decreases  After: no worse    Repeat EIL: During: decreases  After: better          Conclusion: derangement                                         ROS    Assessment/Plan:    Patient is a 34 year old male with lumbar complaints.    Patient has the following significant findings with corresponding treatment plan.                Diagnosis 1:  Acute right low back pain with sciatica  Pain -  hot/cold therapy, US, electric stimulation, mechanical traction, manual therapy, self management, education, directional preference exercise and home program  Decreased ROM/flexibility - manual therapy, therapeutic exercise, therapeutic activity and home program  Decreased strength - therapeutic exercise, therapeutic activities and home program  Decreased function - therapeutic activities and home program  Impaired posture - neuro re-education, therapeutic  activities and home program    Therapy Evaluation Codes:   1) History comprised of:   Personal factors that impact the plan of care:      None.    Comorbidity factors that impact the plan of care are:      Overweight.     Medications impacting care: None.  2) Examination of Body Systems comprised of:   Body structures and functions that impact the plan of care:      Lumbar spine.   Activity limitations that impact the plan of care are:      Bending, Driving and Sitting.  3) Clinical presentation characteristics are:   Stable/Uncomplicated.  4) Decision-Making    Low complexity using standardized patient assessment instrument and/or measureable assessment of functional outcome.  Cumulative Therapy Evaluation is: Low complexity.    Previous and current functional limitations:  (See Goal Flow Sheet for this information)    Short term and Long term goals: (See Goal Flow Sheet for this information)     Communication ability:  Patient appears to be able to clearly communicate and understand verbal and written communication and follow directions correctly.  Treatment Explanation - The following has been discussed with the patient:   RX ordered/plan of care  Anticipated outcomes  Possible risks and side effects  This patient would benefit from PT intervention to resume normal activities.   Rehab potential is good.    Frequency:  2 X week, once daily  Duration:  for 3 weeks  Discharge Plan:  Achieve all LTG.  Independent in home treatment program.  Reach maximal therapeutic benefit.    Please refer to the daily flowsheet for treatment today, total treatment time and time spent performing 1:1 timed codes.

## 2019-12-09 ENCOUNTER — THERAPY VISIT (OUTPATIENT)
Dept: PHYSICAL THERAPY | Facility: CLINIC | Age: 34
End: 2019-12-09
Payer: COMMERCIAL

## 2019-12-09 DIAGNOSIS — M54.41 ACUTE RIGHT-SIDED LOW BACK PAIN WITH RIGHT-SIDED SCIATICA: ICD-10-CM

## 2019-12-09 PROCEDURE — 97140 MANUAL THERAPY 1/> REGIONS: CPT | Mod: GP | Performed by: PHYSICAL THERAPIST

## 2019-12-09 PROCEDURE — 97110 THERAPEUTIC EXERCISES: CPT | Mod: GP | Performed by: PHYSICAL THERAPIST

## 2019-12-13 ENCOUNTER — TELEPHONE (OUTPATIENT)
Dept: FAMILY MEDICINE | Facility: CLINIC | Age: 34
End: 2019-12-13

## 2019-12-13 ENCOUNTER — THERAPY VISIT (OUTPATIENT)
Dept: PHYSICAL THERAPY | Facility: CLINIC | Age: 34
End: 2019-12-13
Payer: COMMERCIAL

## 2019-12-13 DIAGNOSIS — M54.41 ACUTE RIGHT-SIDED LOW BACK PAIN WITH RIGHT-SIDED SCIATICA: Primary | ICD-10-CM

## 2019-12-13 DIAGNOSIS — M54.41 ACUTE RIGHT-SIDED LOW BACK PAIN WITH RIGHT-SIDED SCIATICA: ICD-10-CM

## 2019-12-13 PROCEDURE — 97110 THERAPEUTIC EXERCISES: CPT | Mod: GP | Performed by: PHYSICAL THERAPIST

## 2019-12-13 NOTE — PROGRESS NOTES
Subjective:  HPI                    Objective:  System    Physical Exam    General     ROS    Assessment/Plan:    PROGRESS  REPORT    Progress reporting period is from 12/6/19 to 12/13/19.       SUBJECTIVE  Patient reports he is still having mix feeling about the exercises. Minimal pain during the day but with sleeping at night after 3 hours is in extreme pain low back / but and pain shooting down his leg. So he rolls over but is probably switching back and forth every 30 min. sitting flexion was way wose then standing but both were unfomfortable. then did side glide exercises but did not seem to do much. 5-6/10 right low back pain at this point.     Current Pain level: 5/10.     Initial Pain level: 6/10.   Changes in function:  None  Adverse reaction to treatment or activity: None    OBJECTIVE  hip flexion 5/5 bilaterally, 5/5 knee extension bilaterally, ankle DF 5/5 no pain, toe walking is good. Heel walking is a little more difficult on right but appears to be better. lumbar flexion hands to knee no effect on pain, lumbar extension normal ROM slight pain in low back, Right SB WNLno pain, left SB moderate limitation with pain, hip abd 4/5. Pt strength seems improved from initial visit but pain has not and with pt reports of not sleeping pt has been instructed to followup with MD as there has been no relief.      ASSESSMENT/PLAN  Updated problem list and treatment plan: Diagnosis 1:  Acute right low back pain with sciatica on right  Pain -  hot/cold therapy, US, electric stimulation, manual therapy, self management, education, directional preference exercise and home program  Decreased ROM/flexibility - manual therapy, therapeutic exercise, therapeutic activity and home program  Decreased strength - therapeutic exercise, therapeutic activities and home program  Decreased function - therapeutic activities and home program  STG/LTGs have been met or progress has been made towards goals:  None  Assessment of Progress:  The patient's condition has potential to improve.  Self Management Plans:  Patient has been instructed in a home treatment program.  I have re-evaluated this patient and find that the nature, scope, duration and intensity of the therapy is appropriate for the medical condition of the patient.  Drew continues to require the following intervention to meet STG and LTG's:  PT and follow up with MD do to high pain intensity at night with no relief.     Recommendations:  This patient would benefit from continued therapy.     Frequency:  1 X week, once daily  Duration:  for 4 weeks  But should follow up with MD / neurosurgery as pt sleep pattern over past week has been poor and is not finding any short term relief with therapy currently.     This patient would benefit from further evaluation.    Please refer to the daily flowsheet for treatment today, total treatment time and time spent performing 1:1 timed codes.

## 2019-12-13 NOTE — TELEPHONE ENCOUNTER
Pt informed.  He did not have a pen and paper available, so he will call back to get the address details, etc.

## 2019-12-13 NOTE — TELEPHONE ENCOUNTER
appt scheduled for Monday 12/16 at 2:30 PM at [facility] in [place].    [address] [address]    Department Phone: [phone]     Tried to call pt.  Unable to LM - voicemail box is full.  Please try calling pt again soon, so he has appt details for Mondays appt.

## 2019-12-13 NOTE — TELEPHONE ENCOUNTER
These call patient and assist with scheduling recent neurosurgery consult.    Francisco Bello MD, FAAFP  Family Medicine Physician  [facility] [place]  [address] [place] [address]

## 2019-12-16 ENCOUNTER — OFFICE VISIT (OUTPATIENT)
Dept: NEUROSURGERY | Facility: CLINIC | Age: 34
End: 2019-12-16
Attending: FAMILY MEDICINE
Payer: COMMERCIAL

## 2019-12-16 VITALS
HEART RATE: 109 BPM | OXYGEN SATURATION: 96 % | WEIGHT: 285 LBS | BODY MASS INDEX: 38.6 KG/M2 | HEIGHT: 72 IN | TEMPERATURE: 98.5 F | DIASTOLIC BLOOD PRESSURE: 100 MMHG | SYSTOLIC BLOOD PRESSURE: 153 MMHG

## 2019-12-16 DIAGNOSIS — M54.41 ACUTE RIGHT-SIDED LOW BACK PAIN WITH RIGHT-SIDED SCIATICA: Primary | ICD-10-CM

## 2019-12-16 PROCEDURE — 99203 OFFICE O/P NEW LOW 30 MIN: CPT | Performed by: PHYSICIAN ASSISTANT

## 2019-12-16 PROCEDURE — G0463 HOSPITAL OUTPT CLINIC VISIT: HCPCS

## 2019-12-16 RX ORDER — IBUPROFEN 200 MG
800 TABLET ORAL 3 TIMES DAILY
COMMUNITY
End: 2019-12-20

## 2019-12-16 ASSESSMENT — MIFFLIN-ST. JEOR: SCORE: 2272.81

## 2019-12-16 ASSESSMENT — PAIN SCALES - GENERAL: PAINLEVEL: MODERATE PAIN (5)

## 2019-12-16 NOTE — PROGRESS NOTES
Neurosurgery Clinic Consult    HPI    Mr. Ennis is a 34-year-old male referred to us by his primary care provider Dr. Bello for evaluation of low back pain and right L5 radiculopathies.  This is been present since October.  Patient states that since not improving he reports weakness with dorsiflexion of the right foot and with extensor hallucis longus.  He does not report overt foot drop.  He denies bowel or bladder symptoms.  He is tried some physical therapy without any specific improvement he did have significant improvement on a Medrol Dosepak and is interested in trying an epidural steroid injection not quite ready to consider surgery at this time.    Medical history  Morbid obesity    Social history  Works is in a cabinet shop doing finishing      B/P: 153/100, T: 98.5, P: 109, R: Data Unavailable       Exam    Alert and oriented no acute distress    Left lower extremity with 5-5 strength throughout  Right lower extremity with 5-5 strength in hamstring knee flexion extension, 5- strength in ankle dorsiflexion 4+ strength in extensor hallucis longus on the right, 5-5 strength in plantarflexion  Reflexes 1+ bilateral patella 1+ bilateral internal hamstring 1+ bilateral ankle  Negative ankle clonus negative Babinski   positive straight leg raise on the right and positive cross straight leg raise on the left.  He has slight dropping on the right ankle when he is walking on his right heel.    Imaging    Lumbar MRI demonstrates a right paracentral inferiorly directed disc extrusion at L4-5 contacting the traversing L5 nerve.    Assessment    34-year-old male with right-sided low back pain and right 5 radiculopathy due to an L4-5 disc protrusion.    Plan:      The patient would like to try an epidural steroid injection therefore we will order right L5-S1 epidural steroid injection targeting the L5 nerve which is being irritated at the L4-5 disc space by an inferiorly directed disc herniation.  Patient will pay  attention to how he does and contact us if is not improving and is interested in considering surgical options.  Otherwise he may continue with insertive therapies but I did caution him that if he begins to notice worsening weakness or numbness in his right leg or foot he should contact us sooner rather than later.    Total time of 30 minutes met with the patient today greater than 50% spent face to face in counseling and coordination of care.

## 2019-12-16 NOTE — LETTER
12/16/2019         RE: Drew Ennis  [address]  [place] [address]        Dear Colleague,    Thank you for referring your patient, Drew Ennis, to the [facility]UR[facility]ERY [facility]INIC. Please see a copy of my visit note below.    Neurosurgery Clinic Consult    HPI    Mr. Ennis is a 34-year-old male referred to us by his primary care provider Dr. Bello for evaluation of low back pain and right L5 radiculopathies.  This is been present since October.  Patient states that since not improving he reports weakness with dorsiflexion of the right foot and with extensor hallucis longus.  He does not report overt foot drop.  He denies bowel or bladder symptoms.  He is tried some physical therapy without any specific improvement he did have significant improvement on a Medrol Dosepak and is interested in trying an epidural steroid injection not quite ready to consider surgery at this time.    Medical history  Morbid obesity    Social history  Works is in a cabinet shop doing finishing      B/P: 153/100, T: 98.5, P: 109, R: Data Unavailable       Exam    Alert and oriented no acute distress    Left lower extremity with 5-5 strength throughout  Right lower extremity with 5-5 strength in hamstring knee flexion extension, 5- strength in ankle dorsiflexion 4+ strength in extensor hallucis longus on the right, 5-5 strength in plantarflexion  Reflexes 1+ bilateral patella 1+ bilateral internal hamstring 1+ bilateral ankle  Negative ankle clonus negative Babinski   positive straight leg raise on the right and positive cross straight leg raise on the left.  He has slight dropping on the right ankle when he is walking on his right heel.    Imaging    Lumbar MRI demonstrates a right paracentral inferiorly directed disc extrusion at L4-5 contacting the traversing L5 nerve.    Assessment    34-year-old male with right-sided low back pain and right 5 radiculopathy due to an L4-5 disc protrusion.    Plan:      The  patient would like to try an epidural steroid injection therefore we will order right L5-S1 epidural steroid injection targeting the L5 nerve which is being irritated at the L4-5 disc space by an inferiorly directed disc herniation.  Patient will pay attention to how he does and contact us if is not improving and is interested in considering surgical options.  Otherwise he may continue with insertive therapies but I did caution him that if he begins to notice worsening weakness or numbness in his right leg or foot he should contact us sooner rather than later.    Total time of 30 minutes met with the patient today greater than 50% spent face to face in counseling and coordination of care.    Again, thank you for allowing me to participate in the care of your patient.        Sincerely,        Markus Thomas PA-C

## 2019-12-16 NOTE — NURSING NOTE
"Drew Ennis is a 34 year old male who presents for:  Chief Complaint   Patient presents with     Consult For     Acute right sided low back pain and right sided sciatica.         Initial Vitals:  BP (!) 153/100 (BP Location: Left arm, Patient Position: Sitting, Cuff Size: Adult Large)   Pulse 109   Temp 98.5  F (36.9  C) (Oral)   Ht 5' 11.5\" (1.816 m)   Wt 285 lb (129.3 kg)   SpO2 96%   BMI 39.20 kg/m   Estimated body mass index is 39.2 kg/m  as calculated from the following:    Height as of this encounter: 5' 11.5\" (1.816 m).    Weight as of this encounter: 285 lb (129.3 kg).. Body surface area is 2.55 meters squared. BP completed using cuff size: large  Moderate Pain (5)        Nursing Comments: Patient states that he has right sided low back pain along with right sided leg pain numbness and tingling.         Bonnie Gooden, [facility]    "

## 2019-12-19 ENCOUNTER — HOSPITAL ENCOUNTER (OUTPATIENT)
Dept: GENERAL RADIOLOGY | Facility: CLINIC | Age: 34
End: 2019-12-19
Attending: PHYSICIAN ASSISTANT
Payer: COMMERCIAL

## 2019-12-19 ENCOUNTER — HOSPITAL ENCOUNTER (OUTPATIENT)
Facility: CLINIC | Age: 34
Discharge: HOME | End: 2019-12-19
Admitting: PHYSICIAN ASSISTANT
Payer: COMMERCIAL

## 2019-12-19 VITALS
RESPIRATION RATE: 18 BRPM | HEART RATE: 92 BPM | OXYGEN SATURATION: 100 % | SYSTOLIC BLOOD PRESSURE: 152 MMHG | DIASTOLIC BLOOD PRESSURE: 102 MMHG | TEMPERATURE: 97.4 F

## 2019-12-19 DIAGNOSIS — M54.41 ACUTE RIGHT-SIDED LOW BACK PAIN WITH RIGHT-SIDED SCIATICA: ICD-10-CM

## 2019-12-19 PROCEDURE — 25000125 ZZHC RX 250: Performed by: PHYSICIAN ASSISTANT

## 2019-12-19 PROCEDURE — 62323 NJX INTERLAMINAR LMBR/SAC: CPT

## 2019-12-19 PROCEDURE — 25000128 H RX IP 250 OP 636: Performed by: PHYSICIAN ASSISTANT

## 2019-12-19 PROCEDURE — 25500064 ZZH RX 255 OP 636: Performed by: PHYSICIAN ASSISTANT

## 2019-12-19 RX ORDER — LIDOCAINE HYDROCHLORIDE 10 MG/ML
30 INJECTION, SOLUTION EPIDURAL; INFILTRATION; INTRACAUDAL; PERINEURAL ONCE
Status: COMPLETED | OUTPATIENT
Start: 2019-12-19 | End: 2019-12-19

## 2019-12-19 RX ORDER — DEXAMETHASONE SODIUM PHOSPHATE 10 MG/ML
10 INJECTION, SOLUTION INTRAMUSCULAR; INTRAVENOUS ONCE
Status: COMPLETED | OUTPATIENT
Start: 2019-12-19 | End: 2019-12-19

## 2019-12-19 RX ORDER — IOPAMIDOL 408 MG/ML
10 INJECTION, SOLUTION INTRATHECAL ONCE
Status: COMPLETED | OUTPATIENT
Start: 2019-12-19 | End: 2019-12-19

## 2019-12-19 RX ADMIN — LIDOCAINE HYDROCHLORIDE 6 ML: 10 INJECTION, SOLUTION EPIDURAL; INFILTRATION; INTRACAUDAL; PERINEURAL at 13:39

## 2019-12-19 RX ADMIN — IOPAMIDOL 1 ML: 408 INJECTION, SOLUTION INTRATHECAL at 13:38

## 2019-12-19 RX ADMIN — DEXAMETHASONE SODIUM PHOSPHATE 20 MG: 10 INJECTION, SOLUTION INTRAMUSCULAR; INTRAVENOUS at 13:39

## 2019-12-19 NOTE — PROGRESS NOTES
Care Suites Admission Nursing Note    Reason for admission: EPI  CS arrival time: 1230  Accompanied by: xiomara Navas  Name/phone of DC : [phone]  Medications held: none  Consent signed: per MD  Abnormal assessment/labs: none  If abnormal, provider notified: na  Education/questions answered: yes  Plan: proceed as planned, discharge instruction reviewed

## 2019-12-19 NOTE — PROGRESS NOTES
RADIOLOGY PROCEDURE NOTE  Patient name: Drew Ennis  MRN: [id number]  : 1985    Pre-procedure diagnosis: Low back pain  Post-procedure diagnosis: Same    Procedure Date/Time: 2019  1:43 PM  Procedure: Right L5-S1 TFESI  Estimated blood loss: None  Specimen(s) collected with description: none  The patient tolerated the procedure well with no immediate complications.  Significant findings:none    See imaging dictation for procedural details.    Provider name: ALEJANDRO Patton  Assistant(s):None

## 2019-12-19 NOTE — PROGRESS NOTES
Care Suites Post-Procedure Note    Procedure: EPI  CS arrival time: 1350  Accompanied by: xray tech  Concerns/abnormal assessment after procedure: none. Band aid D & I right lumbar area  Plan: discharge to home

## 2019-12-19 NOTE — PROGRESS NOTES
Care Suites Discharge Nursing Note    Education/questions answered: yes, pt having some increase pain in injection area given ice pack and recommended to take ibuprofen when home, denies any numbness or tingling in right leg.  Band aid D & I  Patient DC location: home  Accompanied by: dad Yosef HENDRICKSON discharge time: 8713

## 2019-12-19 NOTE — DISCHARGE INSTRUCTIONS
Steroid Injection Discharge Instructions     After you go home:      You may resume your normal diet.    Care of Puncture Site:      If you have a bandaid on your puncture site, you may remove it the next morning    You may shower tomorrow    No bath tubs, whirlpools or swimming for at least 3 days     Activity:      You may go back to normal activity in 24 hours    You should let pain be your guide as to the extent of your activities    Maintain any activity limitations as ordered by your provider    Do NOT drive a vehicle if you develop numbness in your arm or leg    Medicines:      You may resume all medications    For minor pain, you may take Acetaminophen (Tylenol) or Ibuprofen (Advil)    Pain:       You may experience increased or different pain over the next 24-48 hours    For the next 48 hrs - you may use ice packs for discomfort     Call your primary care doctor if:      You have severe pain that does not improve with pain medication    You have chills or a fever greater than 101 F (38 C)    The site is red, swollen, hot or tender    New problems with your bowel or bladder    Any questions or concerns    Other Instructions:      New numbness down your leg post injection is temporary and may last for up to 6 hours. You may need assistance with activity until your leg has normal sensation.    For Your Information:      A steroid was injected to help decrease swelling and may help to reduce pain. It may take up to 7-10 days to obtain full results.    Some patients will get lasting relief from a single injection. Others may require up to 3 injections to get results. If you have more than one steroid injection, they should be given 2 weeks apart.    Side effects of your steroid injection are mild and will go away in 2-3 days  - Insomnia  - Heartburn  - Flushed face  - Water retention  - Increased appetite  - Increased blood sugar      If you have questions call:        [facility] Radiology Dept @  [phone]      The provider who performed your procedure was ____Dr Albarran_____________.

## 2019-12-20 ENCOUNTER — OFFICE VISIT (OUTPATIENT)
Dept: FAMILY MEDICINE | Facility: CLINIC | Age: 34
End: 2019-12-20
Payer: COMMERCIAL

## 2019-12-20 VITALS
HEART RATE: 105 BPM | TEMPERATURE: 99 F | RESPIRATION RATE: 16 BRPM | BODY MASS INDEX: 38.8 KG/M2 | DIASTOLIC BLOOD PRESSURE: 108 MMHG | WEIGHT: 286.5 LBS | SYSTOLIC BLOOD PRESSURE: 168 MMHG | HEIGHT: 72 IN | OXYGEN SATURATION: 97 %

## 2019-12-20 DIAGNOSIS — R03.0 ELEVATED BLOOD PRESSURE READING WITHOUT DIAGNOSIS OF HYPERTENSION: ICD-10-CM

## 2019-12-20 DIAGNOSIS — E66.01 MORBID OBESITY (H): ICD-10-CM

## 2019-12-20 DIAGNOSIS — M54.41 ACUTE RIGHT-SIDED LOW BACK PAIN WITH RIGHT-SIDED SCIATICA: Primary | ICD-10-CM

## 2019-12-20 PROCEDURE — 99214 OFFICE O/P EST MOD 30 MIN: CPT | Performed by: FAMILY MEDICINE

## 2019-12-20 RX ORDER — MELOXICAM 15 MG/1
15 TABLET ORAL DAILY
Qty: 60 TABLET | Refills: 0 | Status: SHIPPED | OUTPATIENT
Start: 2019-12-20 | End: 2020-02-11

## 2019-12-20 ASSESSMENT — MIFFLIN-ST. JEOR: SCORE: 2279.62

## 2019-12-20 NOTE — PATIENT INSTRUCTIONS
Drew,    It was great seeing you in clinic today.  I summarized our discussion and your plan below.  Please let me know if you have any questions or concerns.  Please follow up with me as discussed in clinic or sooner if any worsening or additional concerns.     1. Acute right-sided low back pain with right-sided sciatica  34-year-old male with right sided sciatic symptoms, status post corticosteroid injection yesterday.  He has some improvement, but continued pain.  The gabapentin is not working completely for him.  He has been taking ibuprofen daily.  We will switch that to meloxicam, he will stop ibuprofen.  He is also followed by neurosurgery, will continue that.  - meloxicam (MOBIC) 15 MG tablet; Take 1 tablet (15 mg) by mouth daily  Dispense: 60 tablet; Refill: 0    2. Morbid obesity (H)  Previously placed referral to nutrition, patient has self adjusted his diet and lost 15 pounds since his last visit.  He will continue to do this.    3. Elevated blood pressure reading without diagnosis of hypertension  Blood pressure elevated, likely secondary to pain.  Repeat blood pressure in a week, goal less than 140/90.       Sincerely,  Dr. SWATHI Bello MD, FAAFP  Family Medicine Physician  [facility]  [address] [place] [address]    Patient Education

## 2019-12-20 NOTE — LETTER
December 20, 2019      Drew Ennis  [address] [place] [address]      To Whom It May Concern:    Drew Ennis was seen in our clinic. He may return to work with the following: limited to light duty - lifting no greater than 20 pounds, no use of arms over shoulders, no bending/stooping, no climbing, standing limited to 0.5 hrs and walking limited to 0.5 hrs. Limit sitting to 5 minutes at a time.  Continue these work restrictions for the next 4 weeks.      Sincerely,        Francisco Bello MD

## 2019-12-20 NOTE — PROGRESS NOTES
Subjective     Drew Ennis is a 34 year old male who presents to clinic today for the following health issues:    History of Present Illness        He eats 2-3 servings of fruits and vegetables daily.He consumes 0 sweetened beverage(s) daily.  He is taking medications regularly.     Back Pain   Cortisone shot Follow up.     Duration: September 2019.         Specific cause: None    Description:   Location of pain: low back. Cortisone shot done on 12/19/2019 on Lumbar.  - 5S1.   Character of pain: Cramping and achy.   Pain radiation: Right leg.     Intensity: Currently 6/10 on pain scale.    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   History of back problems: no.   Any previous MRI or X-rays: XR - 12/19/2019. MR 11/30/2019  Sees a specialist for back pain:  Spinal specialist. Marian Hogue.    Therapies tried without relief: Patient has not noticed any relief with Gabapentin.     Alleviating factors:   Improved by: None       Precipitating factors:  Worsened by: Laying flat , laying on side , sitting down in a chair , twisting , and bending,         Accompanying Signs & Symptoms:  Risk of Fracture:  None  Risk of Cauda Equina:  None  Risk of Infection:  None  Risk of Cancer:  None  Risk of Ankylosing Spondylitis:  Onset at age <35, male, AND morning back stiffness. no        Patient Active Problem List   Diagnosis     Morbid obesity (H)     Acute right-sided low back pain with right-sided sciatica     History reviewed. No pertinent surgical history.    Social History     Tobacco Use     Smoking status: Never Smoker     Smokeless tobacco: Never Used   Substance Use Topics     Alcohol use: Not Currently     Family History   Problem Relation Age of Onset     Diabetes Father      Diabetes Paternal Grandfather          Current Outpatient Medications   Medication Sig Dispense Refill     gabapentin (NEURONTIN) 300 MG capsule Take 1 capsule (300 mg) by mouth 3 times daily 60 capsule 1     meloxicam  "(MOBIC) 15 MG tablet Take 1 tablet (15 mg) by mouth daily 60 tablet 0     No Known Allergies  Recent Labs   Lab Test 12/02/19  1715   *   HDL 38*   TRIG 242*      BP Readings from Last 3 Encounters:   12/20/19 (!) 168/108   12/19/19 (!) 152/102   12/16/19 (!) 153/100    Wt Readings from Last 3 Encounters:   12/20/19 130 kg (286 lb 8 oz)   12/16/19 129.3 kg (285 lb)   12/02/19 135.5 kg (298 lb 11.2 oz)                    Reviewed and updated as needed this visit by Provider         Review of Systems   ROS COMP: Constitutional, HEENT, cardiovascular, pulmonary, gi and gu systems are negative, except as otherwise noted.      Objective    BP (!) 168/108 (BP Location: Left arm, Patient Position: Sitting, Cuff Size: Adult Large)   Pulse 105   Temp 99  F (37.2  C) (Temporal)   Resp 16   Ht 1.816 m (5' 11.5\")   Wt 130 kg (286 lb 8 oz)   SpO2 97%   BMI 39.40 kg/m    Body mass index is 39.4 kg/m .  Physical Exam   GENERAL: healthy, alert and no distress  RESP: lungs clear to auscultation - no rales, rhonchi or wheezes  CV: regular rate and rhythm, normal S1 S2, no S3 or S4, no murmur, click or rub, no peripheral edema and peripheral pulses strong  NEURO: Normal strength and tone, mentation intact and speech normal  Comprehensive back pain exam:  No tenderness, Pain limits the following motions: Flexion, extension, improved with standing, Lower extremity strength functional and equal on both sides, Lower extremity reflexes within normal limits bilaterally, Lower extremity sensation normal and equal on both sides and Straight leg positive on  left, indicating possible ipsilateral radiculopathy  PSYCH: mentation appears normal, affect normal/bright    Diagnostic Test Results:  Labs reviewed in Epic        ASSESSMENT and PLAN  1. Acute right-sided low back pain with right-sided sciatica  34-year-old male with right sided sciatic symptoms, status post corticosteroid injection yesterday.  He has some improvement, but " continued pain.  The gabapentin is not working completely for him.  He has been taking ibuprofen daily.  We will switch that to meloxicam, he will stop ibuprofen.  He is also followed by neurosurgery, will continue that.  - meloxicam (MOBIC) 15 MG tablet; Take 1 tablet (15 mg) by mouth daily  Dispense: 60 tablet; Refill: 0    2. Morbid obesity (H)  Previously placed referral to nutrition, patient has self adjusted his diet and lost 15 pounds since his last visit.  He will continue to do this.    3. Elevated blood pressure reading without diagnosis of hypertension  Blood pressure elevated, likely secondary to pain.  Repeat blood pressure in a week, goal less than 140/90.    Return in about 3 months (around 3/20/2020) for Follow Up from Today's Encounter.     Francisco Bello MD, FAAFP  Family Medicine Physician  [facility]  [address] [place] [address]

## 2019-12-31 DIAGNOSIS — M54.41 ACUTE RIGHT-SIDED LOW BACK PAIN WITH RIGHT-SIDED SCIATICA: Primary | ICD-10-CM

## 2020-01-19 DIAGNOSIS — M54.41 ACUTE RIGHT-SIDED LOW BACK PAIN WITH RIGHT-SIDED SCIATICA: ICD-10-CM

## 2020-01-20 RX ORDER — GABAPENTIN 300 MG/1
CAPSULE ORAL
Qty: 60 CAPSULE | Refills: 1 | Status: SHIPPED | OUTPATIENT
Start: 2020-01-20 | End: 2020-02-06

## 2020-01-20 NOTE — TELEPHONE ENCOUNTER
Pending Prescriptions:                       Disp   Refills    gabapentin (NEURONTIN) 300 MG capsule [Pha*60 cap*1        Sig: TAKE 1 CAPSULE BY MOUTH THREE TIMES A DAY    Last Written Prescription Date:  12/2/2019  Last Fill Quantity: 60,  # refills: 1   Last office visit: 12/20/2019 with prescribing provider:     Future Office Visit:      Routing refill request to provider for review/approval because:  Drug not on the [facility] refill protocol     Roro Masters, MSN, RN

## 2020-01-22 ENCOUNTER — OFFICE VISIT (OUTPATIENT)
Dept: FAMILY MEDICINE | Facility: CLINIC | Age: 35
End: 2020-01-22
Payer: COMMERCIAL

## 2020-01-22 VITALS
TEMPERATURE: 98.5 F | BODY MASS INDEX: 40.6 KG/M2 | HEIGHT: 71 IN | WEIGHT: 290 LBS | OXYGEN SATURATION: 97 % | RESPIRATION RATE: 20 BRPM | SYSTOLIC BLOOD PRESSURE: 158 MMHG | HEART RATE: 102 BPM | DIASTOLIC BLOOD PRESSURE: 102 MMHG

## 2020-01-22 DIAGNOSIS — R03.0 ELEVATED BLOOD PRESSURE READING WITHOUT DIAGNOSIS OF HYPERTENSION: ICD-10-CM

## 2020-01-22 DIAGNOSIS — E66.01 MORBID OBESITY (H): ICD-10-CM

## 2020-01-22 DIAGNOSIS — M54.41 ACUTE RIGHT-SIDED LOW BACK PAIN WITH RIGHT-SIDED SCIATICA: Primary | ICD-10-CM

## 2020-01-22 LAB
ALBUMIN SERPL-MCNC: 4.4 G/DL (ref 3.4–5)
ALP SERPL-CCNC: 83 U/L (ref 40–150)
ALT SERPL W P-5'-P-CCNC: 90 U/L (ref 0–70)
ANION GAP SERPL CALCULATED.3IONS-SCNC: 6 MMOL/L (ref 3–14)
AST SERPL W P-5'-P-CCNC: 34 U/L (ref 0–45)
BILIRUB SERPL-MCNC: 0.6 MG/DL (ref 0.2–1.3)
BUN SERPL-MCNC: 19 MG/DL (ref 7–30)
CALCIUM SERPL-MCNC: 10 MG/DL (ref 8.5–10.1)
CHLORIDE SERPL-SCNC: 103 MMOL/L (ref 94–109)
CO2 SERPL-SCNC: 27 MMOL/L (ref 20–32)
CREAT SERPL-MCNC: 0.72 MG/DL (ref 0.66–1.25)
GFR SERPL CREATININE-BSD FRML MDRD: >90 ML/MIN/{1.73_M2}
GLUCOSE SERPL-MCNC: 121 MG/DL (ref 70–99)
POTASSIUM SERPL-SCNC: 4.2 MMOL/L (ref 3.4–5.3)
PROT SERPL-MCNC: 8.3 G/DL (ref 6.8–8.8)
SODIUM SERPL-SCNC: 136 MMOL/L (ref 133–144)

## 2020-01-22 PROCEDURE — 99214 OFFICE O/P EST MOD 30 MIN: CPT | Mod: 25 | Performed by: FAMILY MEDICINE

## 2020-01-22 PROCEDURE — 96372 THER/PROPH/DIAG INJ SC/IM: CPT | Performed by: FAMILY MEDICINE

## 2020-01-22 PROCEDURE — 36415 COLL VENOUS BLD VENIPUNCTURE: CPT | Performed by: FAMILY MEDICINE

## 2020-01-22 PROCEDURE — 80053 COMPREHEN METABOLIC PANEL: CPT | Performed by: FAMILY MEDICINE

## 2020-01-22 RX ORDER — NICOTINE POLACRILEX 4 MG
15-30 LOZENGE BUCCAL
Status: CANCELLED | OUTPATIENT
Start: 2020-01-22

## 2020-01-22 RX ORDER — CYCLOBENZAPRINE HCL 10 MG
10 TABLET ORAL 3 TIMES DAILY PRN
Qty: 30 TABLET | Refills: 1 | Status: SHIPPED | OUTPATIENT
Start: 2020-01-22 | End: 2020-02-06

## 2020-01-22 RX ORDER — DEXTROSE MONOHYDRATE 25 G/50ML
25-50 INJECTION, SOLUTION INTRAVENOUS
Status: CANCELLED | OUTPATIENT
Start: 2020-01-22

## 2020-01-22 RX ORDER — KETOROLAC TROMETHAMINE 30 MG/ML
60 INJECTION, SOLUTION INTRAMUSCULAR; INTRAVENOUS ONCE
Status: COMPLETED | OUTPATIENT
Start: 2020-01-22 | End: 2020-01-22

## 2020-01-22 RX ADMIN — KETOROLAC TROMETHAMINE 60 MG: 30 INJECTION, SOLUTION INTRAMUSCULAR; INTRAVENOUS at 10:16

## 2020-01-22 ASSESSMENT — PAIN SCALES - GENERAL: PAINLEVEL: EXTREME PAIN (8)

## 2020-01-22 ASSESSMENT — MIFFLIN-ST. JEOR: SCORE: 2287.56

## 2020-01-22 NOTE — PROGRESS NOTES
Subjective     Drew Ennis is a 34 year old male who presents to clinic today for the following health issues:    HPI   Back Pain       Duration: since September        Specific cause: twisting/turning to get into his car    Description:   Location of pain: low back right  Character of pain: sharp, dull ache, stabbing, gnawing, burning, cramping and constant  Pain radiation:radiates into the right buttocks, radiates into the right foot and radiates around to front mid waist and mid upper back  New numbness or weakness in legs, not attributed to pain:  no     Intensity: Currently 8/10, At its worst 10/10    History:   Pain interferes with job: YES  History of back problems: recurrent self limited episodes of low back pain in the past  Any previous MRI or X-rays: Yes- at [facility].  Date X-ray early November MRI late November  Sees a specialist for back pain:  Odalys Peña  Therapies tried without relief: walking and compression, cold, heat and Physical Therapy    Alleviating factors:   Improved by: normally it would be walking      Precipitating factors:  Worsened by: Bending, Standing, Sitting, Lying Flat and Walking        Accompanying Signs & Symptoms:  Risk of Fracture:  None  Risk of Cauda Equina:  None  Risk of Infection:  None  Risk of Cancer:  None  Risk of Ankylosing Spondylitis:  Onset at age <35, male, AND morning back stiffness. no      Patient Active Problem List   Diagnosis     Morbid obesity (H)     Acute right-sided low back pain with right-sided sciatica     History reviewed. No pertinent surgical history.    Social History     Tobacco Use     Smoking status: Never Smoker     Smokeless tobacco: Never Used   Substance Use Topics     Alcohol use: Not Currently     Family History   Problem Relation Age of Onset     Diabetes Father      Diabetes Paternal Grandfather      Cancer Mother          Current Outpatient Medications   Medication Sig Dispense Refill     cyclobenzaprine (FLEXERIL) 10 MG tablet  "Take 1 tablet (10 mg) by mouth 3 times daily as needed for muscle spasms 30 tablet 1     gabapentin (NEURONTIN) 300 MG capsule TAKE 1 CAPSULE BY MOUTH THREE TIMES A DAY 60 capsule 1     meloxicam (MOBIC) 15 MG tablet Take 1 tablet (15 mg) by mouth daily 60 tablet 0     No Known Allergies  Recent Labs   Lab Test 12/02/19  1715   *   HDL 38*   TRIG 242*      BP Readings from Last 3 Encounters:   01/22/20 (!) 170/110   12/20/19 (!) 168/108   12/19/19 (!) 152/102    Wt Readings from Last 3 Encounters:   01/22/20 131.5 kg (290 lb)   12/20/19 130 kg (286 lb 8 oz)   12/16/19 129.3 kg (285 lb)                    Reviewed and updated as needed this visit by Provider         Review of Systems   ROS COMP: Constitutional, HEENT, cardiovascular, pulmonary, gi and gu systems are negative, except as otherwise noted.      Objective    BP (!) 170/110   Pulse 102   Temp 98.5  F (36.9  C) (Temporal)   Resp 20   Ht 1.803 m (5' 11\")   Wt 131.5 kg (290 lb)   SpO2 97%   BMI 40.45 kg/m    Body mass index is 40.45 kg/m .  Physical Exam   GENERAL: healthy, alert and no distress  NECK: no adenopathy, no asymmetry, masses, or scars and thyroid normal to palpation  RESP: lungs clear to auscultation - no rales, rhonchi or wheezes  CV: regular rate and rhythm, normal S1 S2, no S3 or S4, no murmur, click or rub, no peripheral edema and peripheral pulses strong  MS: no gross musculoskeletal defects noted, no edema  NEURO: Normal strength and tone, mentation intact and speech normal  BACK: no CVA tenderness, no paralumbar tenderness  Comprehensive back pain exam:  No tenderness, Pain limits the following motions: Flexion and extension, Lower extremity strength functional and equal on both sides, Lower extremity reflexes within normal limits bilaterally, Lower extremity sensation normal and equal on both sides and Straight leg raise negative bilaterally  PSYCH: mentation appears normal, affect normal/bright    Diagnostic Test " Results:  Labs reviewed in Epic        ASSESSMENT and PLAN  1. Acute right-sided low back pain with right-sided sciatica  34-year-old male with acute on chronic low back pain with right-sided sciatica.  He is also followed by neurosurgery, status post corticosteroid injections x1, he is scheduled for his next injection on Friday.  Had recent exacerbation this morning.  Will treat current symptoms with Toradol, Flexeril, stretching at home.  Surgery appointment on Friday.  Follow-up with me afterwards.  - ketorolac (TORADOL) injection 60 mg  - Comprehensive metabolic panel (BMP + Alb, Alk Phos, ALT, AST, Total. Bili, TP)  - cyclobenzaprine (FLEXERIL) 10 MG tablet; Take 1 tablet (10 mg) by mouth 3 times daily as needed for muscle spasms  Dispense: 30 tablet; Refill: 1    2. Morbid obesity (H)  Placed nutrition referral a few visits ago, discussed importance of weight loss to help not only with back pain, but if he does require an surgery in the future, this will help with recovery.    3. Elevated blood pressure reading without diagnosis of hypertension  Blood pressure is elevated today, likely secondary to pain.  His blood pressure is been normal without pain.    Return in about 3 months (around 4/22/2020) for Follow Up from Today's Encounter.     Francisco Bello MD, FAAFP  Family Medicine Physician  [facility]  [address] [place] [address]

## 2020-01-22 NOTE — NURSING NOTE
Clinic Administered Medication Documentation    MEDICATION LIST:   Injectable Medication Documentation    Patient was given Ketorolac Tromethamine (Toradol). Prior to medication administration, verified patients identity using patient s name and date of birth. Please see MAR and medication order for additional information. Patient instructed to remain in clinic for 15 minutes.      Was entire vial of medication used? Yes  Vial/Syringe: Single dose vial  Expiration Date:  05/1/2020  Was this medication supplied by the patient? No    Prior to immunization administration, verified patients identity using patient s name and date of birth. Please see Immunization Activity for additional information.     Screening Questionnaire for Adult Immunization    Are you sick today?   No   Do you have allergies to medications, food, a vaccine component or latex?   No   Have you ever had a serious reaction after receiving a vaccination?   No   Do you have a long-term health problem with heart, lung, kidney, or metabolic disease (e.g., diabetes), asthma, a blood disorder, no spleen, complement component deficiency, a cochlear implant, or a spinal fluid leak?  Are you on long-term aspirin therapy?   No   Do you have cancer, leukemia, HIV/AIDS, or any other immune system problem?   No   Do you have a parent, brother, or sister with an immune system problem?   No   In the past 3 months, have you taken medications that affect  your immune system, such as prednisone, other steroids, or anticancer drugs; drugs for the treatment of rheumatoid arthritis, Crohn s disease, or psoriasis; or have you had radiation treatments?   Yes. Prednisone shot.   Huddled with provider.   Have you had a seizure, or a brain or other nervous system problem?   No   During the past year, have you received a transfusion of blood or blood    products, or been given immune (gamma) globulin or antiviral drug?   No   For women: Are you pregnant or is there a chance  you could become       pregnant during the next month?   No   Have you received any vaccinations in the past 4 weeks?   No          Per orders of Dr. Bello, injection of  TORADOL given by Lucero Koch. Patient instructed to remain in clinic for 15 minutes afterwards, and to report any adverse reaction to me immediately.

## 2020-01-22 NOTE — RESULT ENCOUNTER NOTE
Drew,  Your recent studies showed a mild increase in your blood glucose, this is normal given your nonfasting.  1 of your liver enzymes was slightly elevated.  Recommend repeating that test in 6 months..  Please let me know if you have any questions or concerns and follow up as discussed in clinic.    Sincerely.  Dr. SWATHI Bello MD, [facility]  Family Medicine Physician  [facility] [place]  [address] [place] [address]

## 2020-01-22 NOTE — PATIENT INSTRUCTIONS
Drew,    It was great seeing you in clinic today.  I summarized our discussion and your plan below.  Please let me know if you have any questions or concerns.  Please follow up with me as discussed in clinic or sooner if any worsening or additional concerns.     1. Acute right-sided low back pain with right-sided sciatica  34-year-old male with acute on chronic low back pain with right-sided sciatica.  He is also followed by neurosurgery, status post corticosteroid injections x1, he is scheduled for his next injection on Friday.  Had recent exacerbation this morning.  Will treat current symptoms with Toradol, Flexeril, stretching at home.  Surgery appointment on Friday.  Follow-up with me afterwards.  - ketorolac (TORADOL) injection 60 mg  - Comprehensive metabolic panel (BMP + Alb, Alk Phos, ALT, AST, Total. Bili, TP)  - cyclobenzaprine (FLEXERIL) 10 MG tablet; Take 1 tablet (10 mg) by mouth 3 times daily as needed for muscle spasms  Dispense: 30 tablet; Refill: 1    2. Morbid obesity (H)  Placed nutrition referral a few visits ago, discussed importance of weight loss to help not only with back pain, but if he does require an surgery in the future, this will help with recovery.    3. Elevated blood pressure reading without diagnosis of hypertension  Blood pressure is elevated today, likely secondary to pain.  His blood pressure is been normal without pain.       Sincerely,  Dr. SWATHI Bello MD, FAAFP  Family Medicine Physician  [facility] [place]  [address] [place] [address]    Patient Education

## 2020-01-24 ENCOUNTER — HOSPITAL ENCOUNTER (OUTPATIENT)
Facility: CLINIC | Age: 35
Discharge: HOME | End: 2020-01-24
Admitting: RADIOLOGY
Payer: COMMERCIAL

## 2020-01-24 ENCOUNTER — HOSPITAL ENCOUNTER (OUTPATIENT)
Dept: GENERAL RADIOLOGY | Facility: CLINIC | Age: 35
End: 2020-01-24
Attending: FAMILY MEDICINE
Payer: COMMERCIAL

## 2020-01-24 VITALS
DIASTOLIC BLOOD PRESSURE: 104 MMHG | HEART RATE: 92 BPM | SYSTOLIC BLOOD PRESSURE: 166 MMHG | OXYGEN SATURATION: 99 % | TEMPERATURE: 98.2 F | RESPIRATION RATE: 18 BRPM

## 2020-01-24 DIAGNOSIS — M54.41 ACUTE RIGHT-SIDED LOW BACK PAIN WITH RIGHT-SIDED SCIATICA: ICD-10-CM

## 2020-01-24 PROCEDURE — 25500064 ZZH RX 255 OP 636: Performed by: FAMILY MEDICINE

## 2020-01-24 PROCEDURE — 40000863 ZZH STATISTIC RADIOLOGY XRAY, US, CT, MAR, NM

## 2020-01-24 PROCEDURE — 25000128 H RX IP 250 OP 636: Performed by: FAMILY MEDICINE

## 2020-01-24 PROCEDURE — 62323 NJX INTERLAMINAR LMBR/SAC: CPT

## 2020-01-24 PROCEDURE — 25000125 ZZHC RX 250: Performed by: FAMILY MEDICINE

## 2020-01-24 RX ORDER — ACETAMINOPHEN 325 MG/1
650 TABLET ORAL EVERY 6 HOURS PRN
COMMUNITY
End: 2020-03-06

## 2020-01-24 RX ORDER — NICOTINE POLACRILEX 4 MG
15-30 LOZENGE BUCCAL
Status: DISCONTINUED | OUTPATIENT
Start: 2020-01-24 | End: 2020-01-24 | Stop reason: HOSPADM

## 2020-01-24 RX ORDER — DEXTROSE MONOHYDRATE 25 G/50ML
25-50 INJECTION, SOLUTION INTRAVENOUS
Status: DISCONTINUED | OUTPATIENT
Start: 2020-01-24 | End: 2020-01-24 | Stop reason: HOSPADM

## 2020-01-24 RX ORDER — DEXAMETHASONE SODIUM PHOSPHATE 10 MG/ML
10 INJECTION, SOLUTION INTRAMUSCULAR; INTRAVENOUS ONCE
Status: COMPLETED | OUTPATIENT
Start: 2020-01-24 | End: 2020-01-24

## 2020-01-24 RX ORDER — IOPAMIDOL 408 MG/ML
10 INJECTION, SOLUTION INTRATHECAL ONCE
Status: COMPLETED | OUTPATIENT
Start: 2020-01-24 | End: 2020-01-24

## 2020-01-24 RX ADMIN — LIDOCAINE HYDROCHLORIDE 3 ML: 10 INJECTION, SOLUTION EPIDURAL; INFILTRATION; INTRACAUDAL; PERINEURAL at 14:51

## 2020-01-24 RX ADMIN — DEXAMETHASONE SODIUM PHOSPHATE 10 MG: 10 INJECTION, SOLUTION INTRAMUSCULAR; INTRAVENOUS at 14:38

## 2020-01-24 RX ADMIN — IOPAMIDOL 3 ML: 408 INJECTION, SOLUTION INTRATHECAL at 14:50

## 2020-01-24 RX ADMIN — LIDOCAINE HYDROCHLORIDE 10 ML: 10 INJECTION, SOLUTION EPIDURAL; INFILTRATION; INTRACAUDAL; PERINEURAL at 14:38

## 2020-01-24 NOTE — PROGRESS NOTES
Care Suites Discharge Nursing Note    Patient Information  Name: Drew Ennis  Age: 34 year old    Discharge Education:  Discharge instructions reviewed: YES  Additional education/resources provided: has paper  Patient/patient representative verbalizes understanding: YES  Patient discharging on new medications: YES and NO  Medication education completed: na    Discharge Plans:   Discharge location: home  Discharge name/phone number:  Discharge ride contacted: YES  Approximate discharge time: 1511    Discharge Criteria:  Discharge criteria met and vital signs stable: YES    Patient Belongs:  Patient belongings returned to patient: YES    Bonnie Melchor RN

## 2020-01-24 NOTE — DISCHARGE INSTRUCTIONS
Steroid Injection Discharge Instructions     After you go home:      You may resume your normal diet.    Care of Puncture Site:      If you have a bandaid on your puncture site, you may remove it the next morning    You may shower tomorrow    No bath tubs, whirlpools or swimming for at least 3 days     Activity:      You may go back to normal activity in 24 hours    You should let pain be your guide as to the extent of your activities    Maintain any activity limitations as ordered by your provider    Do NOT drive a vehicle if you develop numbness in your arm or leg    Medicines:      You may resume all medications    Resume your Warfarin/Coumadin at your regular dose today. Follow up with your provider to have your INR rechecked    Resume your Platelet Inhibitors and Aspirin tomorrow at your regular dose    For minor pain, you may take Acetaminophen (Tylenol) or Ibuprofen (Advil)    Pain:       You may experience increased or different pain over the next 24-48 hours    For the next 48 hrs - you may use ice packs for discomfort     Call your primary care doctor if:      You have severe pain that does not improve with pain medication    You have chills or a fever greater than 101 F (38 C)    The site is red, swollen, hot or tender    New problems with your bowel or bladder    Any questions or concerns    Other Instructions:      New numbness down your leg post injection is temporary and may last for up to 6 hours. You may need assistance with activity until your leg has normal sensation.    If you are diabetic, monitor your blood sugar closely. Contact the provider who manages your diabetes to help you control your blood sugar if needed.    For Your Information:      A steroid was injected to help decrease swelling and may help to reduce pain. It may take up to 7-10 days to obtain full results.    Some patients will get lasting relief from a single injection. Others may require up to 3 injections to get results. If  you have more than one steroid injection, they should be given 2 weeks apart.    Side effects of your steroid injection are mild and will go away in 2-3 days  - Insomnia  - Heartburn  - Flushed face  - Water retention  - Increased appetite  - Increased blood sugar      If you have questions call:        [facility] @ [phone]

## 2020-01-24 NOTE — PROCEDURES
Interventional Neuroradiology Post Procedure    Patient Name: Drew Ennis  MRN: [id number]  Date of Procedure: January 24, 2020    Procedure: Lumbar epidural steroid injection (Right L4-L5, transforaminal)    Radiologist: Avery Bess MD    Contrast: 3 ml Isovue    Fluoro Time: 0.3 minutes    EBL: Minimal    Complications: None evident    Patient reevaluated immediately prior to sedation and prior to procedure.    Preliminary Report:   (See dictation for full detail)  - Lumbar epidural steroid injection (Right L4-L5, transforaminal)    Assess/Plan:  Bed rest for 30 minutes.  Report to ordering.    Avery Bess MD  [phone]

## 2020-01-24 NOTE — PROGRESS NOTES
Care Suites Post Procedure Note    Patient Information  Name: Drew Ennis  Age: 34 year old    Post Procedure  Procedure:epidural injection  Time patient returned to Care Suites: 1447  Concerns/abnormal assessment: VSS. Pain at 5 low back and right foot. Site CDI with bandaid. Given water. Refused food or other beverage. Person in room with him. Has copy of discharge instructions.  If abnormal assessment, provider notified: No  Plan/Other: per orders discharge in 20 min.    Bonnie Melchor RN

## 2020-01-29 ENCOUNTER — OFFICE VISIT (OUTPATIENT)
Dept: FAMILY MEDICINE | Facility: CLINIC | Age: 35
End: 2020-01-29
Payer: COMMERCIAL

## 2020-01-29 VITALS
TEMPERATURE: 97.6 F | BODY MASS INDEX: 40.6 KG/M2 | OXYGEN SATURATION: 98 % | HEART RATE: 110 BPM | HEIGHT: 71 IN | SYSTOLIC BLOOD PRESSURE: 140 MMHG | WEIGHT: 290 LBS | RESPIRATION RATE: 14 BRPM | DIASTOLIC BLOOD PRESSURE: 78 MMHG

## 2020-01-29 DIAGNOSIS — E66.01 MORBID OBESITY (H): ICD-10-CM

## 2020-01-29 DIAGNOSIS — R03.0 ELEVATED BLOOD PRESSURE READING WITHOUT DIAGNOSIS OF HYPERTENSION: ICD-10-CM

## 2020-01-29 DIAGNOSIS — M54.41 ACUTE RIGHT-SIDED LOW BACK PAIN WITH RIGHT-SIDED SCIATICA: Primary | ICD-10-CM

## 2020-01-29 PROCEDURE — 99213 OFFICE O/P EST LOW 20 MIN: CPT | Mod: 25 | Performed by: FAMILY MEDICINE

## 2020-01-29 PROCEDURE — 97810 ACUP 1/> WO ESTIM 1ST 15 MIN: CPT | Performed by: FAMILY MEDICINE

## 2020-01-29 ASSESSMENT — MIFFLIN-ST. JEOR: SCORE: 2287.56

## 2020-01-29 ASSESSMENT — PAIN SCALES - GENERAL
PAINLEVEL: MODERATE PAIN (4)
PAINLEVEL: SEVERE PAIN (7)

## 2020-01-29 NOTE — PATIENT INSTRUCTIONS
Drew,    It was great seeing you in clinic today.  I summarized our discussion and your plan below.  Please let me know if you have any questions or concerns.  Please follow up with me as discussed in clinic or sooner if any worsening or additional concerns.     1. Acute right-sided low back pain with right-sided sciatica  34-year-old male with chronic right-sided low back pain with sciatica.  No improvement with recent epidural steroid injection.  He is interested in surgery, appointment was made with neurosurgery to discuss that.  We conducted auricular acupuncture today with good improvement in his pain from 8 out of 10 to 3 out of 10.  We placed indwelling pins so would expect continued pain improvement for the next few days.  Follow-up if no improvement or any worsening.    2. Morbid obesity (H)  We discussed the importance of weight loss for his back health in the past.  He continues to try to diet, however he is unable to exercise at this time.    3. Elevated blood pressure reading without diagnosis of hypertension  His blood pressure has been elevated in the past, thought to be secondary to pain.  Blood pressure improved following acupuncture treatment.       Sincerely,  Dr. SWATHI Bello MD, FAAFP  Family Medicine Physician  [facility] [place]  [address] [place] [address]    Patient Education

## 2020-01-29 NOTE — PROGRESS NOTES
"Subjective     Drew Ennis is a 34 year old male who presents to clinic today for the following health issues:    HPI   Back Pain   He is here to follow up on his herniated disk.     Duration: October        Specific cause: he was getting in his car and something \"popped\"- this ws back in October. States that he re hurt it last Thursday. He got a second lumbar injection and that did not help at all. Thinking that he is just going to do surgery  and get it done with.    Description:   Location of pain: low back right  Character of pain: sharp, dull ache, stabbing, burning, fullness, cramping and waxing and waning  Pain radiation:radiates into the right buttocks, radiates into the right leg and radiates into the right foot  New numbness or weakness in legs, not attributed to pain:  YES- very little    Intensity: Currently 7/10 10/10 at the worst    History:   Pain interferes with job: YES  History of back problems: no prior back problems  Any previous MRI or X-rays: Yes- at [facility].  Date   Sees a specialist for back pain:  Yes, Dr. Thomas, no contact or appointment made at this time  Therapies tried without relief: chiropractor, cold, heat, opioids, Physical Therapy and steroid injection    Alleviating factors:   Improved by: muscle relaxants      Precipitating factors:  Worsened by: Lifting, Bending, Standing, Sitting, Lying Flat and Walking          Accompanying Signs & Symptoms:  Risk of Fracture:  None  Risk of Cauda Equina:  None  Risk of Infection:  None  Risk of Cancer:  None  Risk of Ankylosing Spondylitis:  Onset at age <35, male, AND morning back stiffness. no        Patient Active Problem List   Diagnosis     Morbid obesity (H)     Acute right-sided low back pain with right-sided sciatica     History reviewed. No pertinent surgical history.    Social History     Tobacco Use     Smoking status: Never Smoker     Smokeless tobacco: Never Used   Substance Use Topics     Alcohol use: Not Currently     " "Family History   Problem Relation Age of Onset     Diabetes Father      Diabetes Paternal Grandfather      Cancer Mother          Current Outpatient Medications   Medication Sig Dispense Refill     acetaminophen (TYLENOL) 325 MG tablet Take 650 mg by mouth every 6 hours as needed for mild pain       cyclobenzaprine (FLEXERIL) 10 MG tablet Take 1 tablet (10 mg) by mouth 3 times daily as needed for muscle spasms 30 tablet 1     gabapentin (NEURONTIN) 300 MG capsule TAKE 1 CAPSULE BY MOUTH THREE TIMES A DAY 60 capsule 1     meloxicam (MOBIC) 15 MG tablet Take 1 tablet (15 mg) by mouth daily 60 tablet 0     Ketorolac Tromethamine (TORADOL IM)        No Known Allergies  Recent Labs   Lab Test 01/22/20  1016 12/02/19  1715   LDL  --  153*   HDL  --  38*   TRIG  --  242*   ALT 90*  --    CR 0.72  --    GFRESTIMATED >90  --    GFRESTBLACK >90  --    POTASSIUM 4.2  --       BP Readings from Last 3 Encounters:   01/29/20 (!) 140/78   01/24/20 (!) 166/104   01/22/20 (!) 158/102    Wt Readings from Last 3 Encounters:   01/29/20 131.5 kg (290 lb)   01/22/20 131.5 kg (290 lb)   12/20/19 130 kg (286 lb 8 oz)                    Reviewed and updated as needed this visit by Provider         Review of Systems   ROS COMP: Constitutional, HEENT, cardiovascular, pulmonary, gi and gu systems are negative, except as otherwise noted.      Objective    BP (!) 140/78   Pulse 110   Temp 97.6  F (36.4  C) (Temporal)   Resp 14   Ht 1.803 m (5' 11\")   Wt 131.5 kg (290 lb)   SpO2 98%   BMI 40.45 kg/m    Body mass index is 40.45 kg/m .  Physical Exam   GENERAL: healthy, alert and no distress  RESP: lungs clear to auscultation - no rales, rhonchi or wheezes  CV: regular rate and rhythm, normal S1 S2, no S3 or S4, no murmur, click or rub, no peripheral edema and peripheral pulses strong  NEURO: Normal strength and tone, mentation intact and speech normal  PSYCH: mentation appears normal, affect normal/bright    Diagnostic Test Results:  Labs " reviewed in Epic      34 year old male here for auricular acupuncture procedure.  We discussed current diagnosis, the benefits and limitations of auricular acupuncture, and the risks of the procedure to include bleeding, infection, lack of response.  Obtained written consent.  Outer ear was cleansed with alcohol.  ASP gold pins were placed using a Pointer Plus in the following points: Gonzales Men, Point Zero, Tranquilizer, master cerebral, hypothalamus, amygdala, low back x2.  The patient was advised that the pins would epithelialize and fall out in the next 3 to 5 days.  Overall response to the procedure was very positive with a reduction of pain from 8/10 to 3/10.  Patient was discharged in stable condition with wound care and follow up instructions.  I spent at least 15 minutes face to face with the patient throughout this 40 minute appointment.    ASSESSMENT and PLAN  1. Acute right-sided low back pain with right-sided sciatica  34-year-old male with chronic right-sided low back pain with sciatica.  No improvement with recent epidural steroid injection.  He is interested in surgery, appointment was made with neurosurgery to discuss that.  We conducted auricular acupuncture today with good improvement in his pain from 8 out of 10 to 3 out of 10.  We placed indwelling pins so would expect continued pain improvement for the next few days.  Follow-up if no improvement or any worsening.    2. Morbid obesity (H)  We discussed the importance of weight loss for his back health in the past.  He continues to try to diet, however he is unable to exercise at this time.    3. Elevated blood pressure reading without diagnosis of hypertension  His blood pressure has been elevated in the past, thought to be secondary to pain.  Blood pressure improved following acupuncture treatment.    Return for Follow Up from Today's Encounter.     Francisco Bello MD, FAAFP  Family Medicine Physician  [facility] [place]  [address]  [place], [place] [address]

## 2020-02-06 ENCOUNTER — MYC REFILL (OUTPATIENT)
Dept: FAMILY MEDICINE | Facility: CLINIC | Age: 35
End: 2020-02-06

## 2020-02-06 DIAGNOSIS — M54.41 ACUTE RIGHT-SIDED LOW BACK PAIN WITH RIGHT-SIDED SCIATICA: ICD-10-CM

## 2020-02-07 RX ORDER — CYCLOBENZAPRINE HCL 10 MG
10 TABLET ORAL 3 TIMES DAILY PRN
Qty: 30 TABLET | Refills: 1 | Status: ON HOLD | OUTPATIENT
Start: 2020-02-07 | End: 2020-03-04

## 2020-02-07 RX ORDER — GABAPENTIN 300 MG/1
300 CAPSULE ORAL 3 TIMES DAILY
Qty: 60 CAPSULE | Refills: 1 | Status: SHIPPED | OUTPATIENT
Start: 2020-02-07 | End: 2021-04-23

## 2020-02-07 NOTE — TELEPHONE ENCOUNTER
Pending Prescriptions:                       Disp   Refills    gabapentin (NEURONTIN) 300 MG capsule     60 cap*1       Last Written Prescription Date:  1/20/20  Last Fill Quantity: 60,  # refills: 1   Last office visit: 1/29/2020 with prescribing provider:     Future Office Visit:   Next 5 appointments (look out 90 days)    Feb 17, 2020 11:30 AM CST  Return Visit with Agus Can MD  [facility] ([facility]) [address] [place] [id number]  [phone]                  cyclobenzaprine (FLEXERIL) 10 MG tablet   30 tab*1            Sig: Take 1 tablet (10 mg) by mouth 3 times daily as           needed for muscle spasms    Last Written Prescription Date:  1/22/2020  Last Fill Quantity: 30,  # refills: 1   Last office visit: 1/29/2020 with prescribing provider:     Future Office Visit:   Next 5 appointments (look out 90 days)    Feb 17, 2020 11:30 AM CST  Return Visit with Agus Can MD  [facility] ([facility]) [address] [place] [id number] [phone]         Roro Masters, MSN, RN

## 2020-02-10 DIAGNOSIS — M54.41 ACUTE RIGHT-SIDED LOW BACK PAIN WITH RIGHT-SIDED SCIATICA: ICD-10-CM

## 2020-02-11 RX ORDER — MELOXICAM 15 MG/1
TABLET ORAL
Qty: 60 TABLET | Refills: 0 | Status: ON HOLD | OUTPATIENT
Start: 2020-02-11 | End: 2020-03-04

## 2020-02-11 NOTE — TELEPHONE ENCOUNTER
Pending Prescriptions:                       Disp   Refills    meloxicam (MOBIC) 15 MG tablet [Pharmacy M*60 tab*0        Sig: TAKE 1 TABLET BY MOUTH EVERY DAY      Routing refill request to provider for review/approval because:  Labs out of range:  ALT, BP  Labs not current:  CBC    Last Written Prescription Date:  12/20/2019  Last Fill Quantity: 60,  # refills: 0   Last office visit: 1/29/2020 with prescribing provider:     Future Office Visit:   Next 5 appointments (look out 90 days)    Feb 17, 2020 11:30 AM CST  Return Visit with Agus Can MD  [facility] ([facility]) [address] [place] [id number] [phone]         Elvira Pinon RN BSN

## 2020-02-15 PROBLEM — M54.41 ACUTE RIGHT-SIDED LOW BACK PAIN WITH RIGHT-SIDED SCIATICA: Status: RESOLVED | Noted: 2019-12-06 | Resolved: 2020-02-15

## 2020-02-15 NOTE — PROGRESS NOTES
Patient did not return for further treatment and no additional progress was noted.  Please refer to the progress note and goal flowsheet completed on 12/13/19 for discharge information.

## 2020-02-17 ENCOUNTER — OFFICE VISIT (OUTPATIENT)
Dept: NEUROSURGERY | Facility: CLINIC | Age: 35
End: 2020-02-17
Attending: NEUROLOGICAL SURGERY
Payer: COMMERCIAL

## 2020-02-17 VITALS
HEART RATE: 93 BPM | WEIGHT: 297 LBS | BODY MASS INDEX: 41.58 KG/M2 | SYSTOLIC BLOOD PRESSURE: 160 MMHG | DIASTOLIC BLOOD PRESSURE: 118 MMHG | HEIGHT: 71 IN

## 2020-02-17 DIAGNOSIS — M54.41 ACUTE RIGHT-SIDED LOW BACK PAIN WITH RIGHT-SIDED SCIATICA: Primary | ICD-10-CM

## 2020-02-17 PROCEDURE — 99215 OFFICE O/P EST HI 40 MIN: CPT | Performed by: NEUROLOGICAL SURGERY

## 2020-02-17 ASSESSMENT — PAIN SCALES - GENERAL: PAINLEVEL: EXTREME PAIN (8)

## 2020-02-17 ASSESSMENT — MIFFLIN-ST. JEOR: SCORE: 2319.31

## 2020-02-17 NOTE — PROGRESS NOTES
Patient Education    Education included but not limited to:  - Surgical risks: blood clots, urinating difficulties, nerve damage, infection.  - Pre-operative physical with primary care physician within 30 days of surgical date.   - Pre-operative clearance from other pertaining specialties.   - Discontinue NSAIDS x 7 days prior to surgical date.   - Do not begin taking NSAIDs (Advil, Motrin, Ibuprofen, Nuprin, Diclofenac, Meloxicam, Aleve, Celebrex, Aspirin, etc.) until 2 weeks after surgery. May cause bleeding and interfere with bone healing.    -May try Tylenol for pain.    -Discussed being off work after surgery, short term disability, FMLA, etc.   -Forms to be completed    -Pre-op timeline: NPO, shower, medications    -Hospital stay: Checking in, surgery, recovery room, hospital room.    - Post operative pain management: narcotics, muscle relaxants, ice, etc.   -No driving while taking narcotics     -Post operative incision care:   Keep your incision clean and dry.   Okay to shower. No submerging in water until incision healed.   Watch for signs of infection and notify clinic if drainage or fever develops.   - Post operative activity limitations recommended until follow up appointment: no lifting > 10 pounds; limited bending, twisting, overhead reaching.  - Follow up appointments: Incision check at 2 weeks post op, 6 week post op, 3 months post op. Please call to schedule follow up appointment at [phone].   - Education book was also given to the patient for further review.      Patient verbalized understanding of above instructions. All questions were answered to the best of my ability and the patient's satisfaction. Patient advised to call with any additional questions or concerns.

## 2020-02-17 NOTE — NURSING NOTE
"Drew Ennis is a 34 year old male who presents for:  Chief Complaint   Patient presents with     Neurologic Problem     Patient presents with low back pain radiating down right side of leg        Initial Vitals:  BP (!) 160/118   Pulse 93   Ht 5' 11\" (1.803 m)   Wt 297 lb (134.7 kg)   BMI 41.42 kg/m   Estimated body mass index is 41.42 kg/m  as calculated from the following:    Height as of this encounter: 5' 11\" (1.803 m).    Weight as of this encounter: 297 lb (134.7 kg).. Body surface area is 2.6 meters squared. BP completed using cuff size: X-large  Extreme Pain (8)    Nursing Comments: patient presents with side sided lower lumbar pain level 8 radiates down his right leg and a little bit into the groin area    GILDARDO David to follow up with Primary Care provider regarding elevated blood pressure.  "

## 2020-02-17 NOTE — LETTER
2/17/2020         RE: Drew Ennis  [address]  [place] [address]        Dear Colleague,    Thank you for referring your patient, Drew Ennis, to the [facility]. Please see a copy of my visit note below.     Patient Education    Education included but not limited to:  - Surgical risks: blood clots, urinating difficulties, nerve damage, infection.  - Pre-operative physical with primary care physician within 30 days of surgical date.   - Pre-operative clearance from other pertaining specialties.   - Discontinue NSAIDS x 7 days prior to surgical date.   - Do not begin taking NSAIDs (Advil, Motrin, Ibuprofen, Nuprin, Diclofenac, Meloxicam, Aleve, Celebrex, Aspirin, etc.) until 2 weeks after surgery. May cause bleeding and interfere with bone healing.    -May try Tylenol for pain.    -Discussed being off work after surgery, short term disability, FMLA, etc.   -Forms to be completed    -Pre-op timeline: NPO, shower, medications    -Hospital stay: Checking in, surgery, recovery room, hospital room.    - Post operative pain management: narcotics, muscle relaxants, ice, etc.   -No driving while taking narcotics     -Post operative incision care:   Keep your incision clean and dry.   Okay to shower. No submerging in water until incision healed.   Watch for signs of infection and notify clinic if drainage or fever develops.   - Post operative activity limitations recommended until follow up appointment: no lifting > 10 pounds; limited bending, twisting, overhead reaching.  - Follow up appointments: Incision check at 2 weeks post op, 6 week post op, 3 months post op. Please call to schedule follow up appointment at [phone].   - Education book was also given to the patient for further review.      Patient verbalized understanding of above instructions. All questions were answered to the best of my ability and the patient's satisfaction. Patient advised to call with any  additional questions or concerns.                Mr. Ennis returns following a lumbar epidural steroid injection without significant or lasting symptomatic improvement.  He continues with intractable right sciatica as well as dorsiflexor weakness at the right ankle while walking.  He remains uncomfortable when seated and when I entered the room was standing in the corner.  He denies symptoms into his left leg.  He has no complaints of changes in bowel or bladder control.  He does report that recently at work he bent forward and felt something pop or shift in his back after which his symptoms increased.  For full details of initial neurosurgical presentation, please refer to 's notes from late in 2019.  MRI scan previously obtained demonstrated a focal extruded disc fragment at right L4-5 with clear impingement of the transiting right L5 nerve root.    On examination today he has a markedly antalgic gait toward the right side.  He has 4/5 strength with dorsiflexion of the right ankle while weightbearing.  He has a small amount of weakness with plantarflexion at the right ankle while weightbearing rated 5-/5.  Left lower extremity is 5/5 throughout.  Deep tendon reflexes are 1+ and equal at the knees and ankles bilaterally.  Straight leg raising is floridly positive on the left side at 30 degrees seated.  Sensory examination suggest a left L5 dermatomal sensory disturbance.    Assessment: Symptomatic and functionally limiting left L5 radiculopathy with evidence of objective neurologic deficit including significant weakness of dorsiflexion of the left ankle in this otherwise healthy and active 34-year-old man who has failed a prolonged and appropriate course of nonsurgical management for this problem.  I reviewed the clinical and radiographic findings in detail with him today in the office and discussed management alternatives including both surgical and nonsurgical means of management.  I spoke with him at  length regarding the details of a right L4-5 hemilaminectomy and microdiscectomy for decompression of the affected right L5 nerve root.  I told him that the risks of surgery include failure to improve his symptoms, increased pain weakness or numbness, injury to the nerve root or dural covering, infection, bleeding, residual or recurrent disc herniation, etc.  He appeared to have a good understanding of this discussion and asked appropriate questions which I answered.  Additionally, he watched the lumbar disc herniation video while he was in the office.  We will plan to proceed with surgery as soon as feasible given the severity and longevity of his symptoms as well as the presence of significant weakness of right ankle dorsiflexion.    More than 45 minutes was spent direct contact with the patient the majority reviewing the clinical and radiographic findings, discussing prognosis and management alternatives risks and benefits.    Again, thank you for allowing me to participate in the care of your patient.        Sincerely,        Agus Can MD

## 2020-02-17 NOTE — PROGRESS NOTES
Mr. Ennis returns following a lumbar epidural steroid injection without significant or lasting symptomatic improvement.  He continues with intractable right sciatica as well as dorsiflexor weakness at the right ankle while walking.  He remains uncomfortable when seated and when I entered the room was standing in the corner.  He denies symptoms into his left leg.  He has no complaints of changes in bowel or bladder control.  He does report that recently at work he bent forward and felt something pop or shift in his back after which his symptoms increased.  For full details of initial neurosurgical presentation, please refer to 's notes from late in 2019.  MRI scan previously obtained demonstrated a focal extruded disc fragment at right L4-5 with clear impingement of the transiting right L5 nerve root.    On examination today he has a markedly antalgic gait toward the right side.  He has 4/5 strength with dorsiflexion of the right ankle while weightbearing.  He has a small amount of weakness with plantarflexion at the right ankle while weightbearing rated 5-/5.  Left lower extremity is 5/5 throughout.  Deep tendon reflexes are 1+ and equal at the knees and ankles bilaterally.  Straight leg raising is floridly positive on the left side at 30 degrees seated.  Sensory examination suggest a left L5 dermatomal sensory disturbance.    Assessment: Symptomatic and functionally limiting left L5 radiculopathy with evidence of objective neurologic deficit including significant weakness of dorsiflexion of the left ankle in this otherwise healthy and active 34-year-old man who has failed a prolonged and appropriate course of nonsurgical management for this problem.  I reviewed the clinical and radiographic findings in detail with him today in the office and discussed management alternatives including both surgical and nonsurgical means of management.  I spoke with him at length regarding the details of a right L4-5  hemilaminectomy and microdiscectomy for decompression of the affected right L5 nerve root.  I told him that the risks of surgery include failure to improve his symptoms, increased pain weakness or numbness, injury to the nerve root or dural covering, infection, bleeding, residual or recurrent disc herniation, etc.  He appeared to have a good understanding of this discussion and asked appropriate questions which I answered.  Additionally, he watched the lumbar disc herniation video while he was in the office.  We will plan to proceed with surgery as soon as feasible given the severity and longevity of his symptoms as well as the presence of significant weakness of right ankle dorsiflexion.    More than 45 minutes was spent direct contact with the patient the majority reviewing the clinical and radiographic findings, discussing prognosis and management alternatives risks and benefits.

## 2020-02-17 NOTE — PATIENT INSTRUCTIONS
Patient to complete updated Lumbar MRI prior to surgery, patient notified and will call to schedule MRI  Patient Instructions  Pre-Operative:  -Surgery scheduled at [facility] for right L4-5 hemilaminectomy and microdiscectomy with Dr. Can  -Surgical risks: blood clots in the leg or lung, problems urinating, nerve damage, drainage from the incision, infection,stiffness  - Pre-operative physical with primary care physician within 30 days of surgical date.   - Likely same day procedure with discharge home day of surgery, may stay for 23 hour observation hospitalization for monitoring.     -Shower procedure: please shower with antimicrobial soap the night before surgery and morning of surgery. Please refer to showering instruction sheet in folder.  -Stop all solid foods 8 hours before surgery.  -Keep drinking clear liquids until 4 hours before surgery. Clear liquids include water, clear juice, black coffee, or clear tea without milk, Gatorade, clear soda.   - Discontinue Aspirin, NSAIDs (Advil/Ibuprofen, Indocin, Naproxen,Nuprin,Relafen/Nabumetone, Diclofenac,Meloxicam, Aleve, Celebrex) x 7 days prior to surgical date.  - May try tylenol for pain 1000 mg three times per day for pain    Post-Operative:  - you may resume taking NSAIDs 14 days post op   - Post operative incisional pain which will require pain medications and muscle relaxants. You will receive medication upon discharge.  -Do NOT drive while taking narcotic pain medication.  -Do not drink alcohol while using any pain medication  -Post operative incision care- Watch for signs of infection: redness, swelling, warmth, drainage, and fever of 101 degrees or higher. [facility] [phone].  -No submerging incision in water such as pools, hot tubs,baths for at least 8 weeks or until incision is healed. Showers are fine.   - Post operative activity limitations: 6-8 weeks, no lifting > 10 pounds, limited bending, twisting, or overhead  reaching.  -Ok to walk as tolerated, avoid bed rest and prolonged sitting.  -No contact sports until after follow up visit  -No high impact activities such as; running/jogging, snowmobile or 4 bell riding or any other recreational vehicles.  -Video: https://www.shipbeat.com/watch?v=[email or web address]    (vidoe for lumbar disc herniation)  - Post op follow up appointments: 2 week incision check with RN, 6 week post op follow up visit with Physician Assistant. Please call to schedule follow up appointment at [phone].  -If you are currently employed, you will need to be off work for 2-4 weeks for post op recovery and healing.  Please fax any FMLA/short term disability paperwork to [phone]. You may call our clinic when you'd like to return to work and we can provide a work letter.

## 2020-02-18 ENCOUNTER — OFFICE VISIT (OUTPATIENT)
Dept: FAMILY MEDICINE | Facility: CLINIC | Age: 35
End: 2020-02-18
Payer: COMMERCIAL

## 2020-02-18 VITALS
RESPIRATION RATE: 16 BRPM | HEART RATE: 111 BPM | HEIGHT: 73 IN | WEIGHT: 294.1 LBS | DIASTOLIC BLOOD PRESSURE: 128 MMHG | BODY MASS INDEX: 38.98 KG/M2 | SYSTOLIC BLOOD PRESSURE: 168 MMHG | OXYGEN SATURATION: 98 % | TEMPERATURE: 97.7 F

## 2020-02-18 DIAGNOSIS — M54.41 ACUTE RIGHT-SIDED LOW BACK PAIN WITH RIGHT-SIDED SCIATICA: ICD-10-CM

## 2020-02-18 DIAGNOSIS — I10 HYPERTENSION, UNSPECIFIED TYPE: ICD-10-CM

## 2020-02-18 DIAGNOSIS — Z01.818 PREOP GENERAL PHYSICAL EXAM: Primary | ICD-10-CM

## 2020-02-18 LAB
ERYTHROCYTE [DISTWIDTH] IN BLOOD BY AUTOMATED COUNT: 13.5 % (ref 10–15)
HCT VFR BLD AUTO: 47.3 % (ref 40–53)
HGB BLD-MCNC: 16.4 G/DL (ref 13.3–17.7)
MCH RBC QN AUTO: 28.9 PG (ref 26.5–33)
MCHC RBC AUTO-ENTMCNC: 34.7 G/DL (ref 31.5–36.5)
MCV RBC AUTO: 83 FL (ref 78–100)
PLATELET # BLD AUTO: 235 10E9/L (ref 150–450)
RBC # BLD AUTO: 5.67 10E12/L (ref 4.4–5.9)
WBC # BLD AUTO: 8.1 10E9/L (ref 4–11)

## 2020-02-18 PROCEDURE — 36415 COLL VENOUS BLD VENIPUNCTURE: CPT | Performed by: FAMILY MEDICINE

## 2020-02-18 PROCEDURE — 99214 OFFICE O/P EST MOD 30 MIN: CPT | Mod: 25 | Performed by: FAMILY MEDICINE

## 2020-02-18 PROCEDURE — 85027 COMPLETE CBC AUTOMATED: CPT | Performed by: FAMILY MEDICINE

## 2020-02-18 PROCEDURE — 93000 ELECTROCARDIOGRAM COMPLETE: CPT | Performed by: FAMILY MEDICINE

## 2020-02-18 PROCEDURE — 97810 ACUP 1/> WO ESTIM 1ST 15 MIN: CPT | Performed by: FAMILY MEDICINE

## 2020-02-18 PROCEDURE — 80053 COMPREHEN METABOLIC PANEL: CPT | Performed by: FAMILY MEDICINE

## 2020-02-18 RX ORDER — OMEGA-3 FATTY ACIDS/FISH OIL 300-1000MG
CAPSULE ORAL
Status: ON HOLD | COMMUNITY
End: 2020-03-04

## 2020-02-18 RX ORDER — AMLODIPINE BESYLATE 5 MG/1
5 TABLET ORAL DAILY
Qty: 60 TABLET | Refills: 1 | Status: SHIPPED | OUTPATIENT
Start: 2020-02-18 | End: 2020-03-06

## 2020-02-18 ASSESSMENT — MIFFLIN-ST. JEOR: SCORE: 2331.52

## 2020-02-18 ASSESSMENT — PAIN SCALES - GENERAL: PAINLEVEL: EXTREME PAIN (9)

## 2020-02-18 NOTE — PATIENT INSTRUCTIONS
Drew,    It was great seeing you in clinic today.  I summarized our discussion and your plan below.  Please let me know if you have any questions or concerns.  Please follow up with me as discussed in clinic or sooner if any worsening or additional concerns.     1. Preop general physical exam  34-year-old male with right-sided low back pain, right-sided sciatic symptoms.  No improvement with conservative management.  He has planned treatment by neurosurgery in 2 weeks.  Examination today showed obesity, elevated blood pressure-likely secondary to pain.  Cleared to proceed with procedure.    2. Acute right-sided low back pain with right-sided sciatica  No improvement in low back pain with most conservative management.  He did have improvement from acupuncture last visit.  See below.  He was also approved for upcoming surgery in 2 weeks for proposed laminectomy.  - ACUPUNCTURE, 1+ NEEDLES, W/O ELECTRICAL STIM; INIT 15 MIN PERSONAL CONTACT    3.  Hypertension  Patient with continued elevated blood pressure, despite acupuncture.  A good portion of his elevated blood pressure likely secondary to pain.  Given this we will treat with amlodipine.  Follow-up in 4 days for blood pressure recheck.  We will need to get better control of his blood pressure prior to the surgery.       Sincerely,  Dr. SWATHI Bello MD, [facility]  Family Medicine Physician  [facility] [place] [phone]    Patient Education      Before Your Surgery      Call your surgeon if there is any change in your health. This includes signs of a cold or flu (such as a sore throat, runny nose, cough, rash or fever).    Do not smoke, drink alcohol or take over the counter medicine (unless your surgeon or primary care doctor tells you to) for the 24 hours before and after surgery.    If you take prescribed drugs: Follow your doctor s orders about which medicines to take and which to stop until after surgery.    Eating and  drinking prior to surgery: follow the instructions from your surgeon    Take a shower or bath the night before surgery. Use the soap your surgeon gave you to gently clean your skin. If you do not have soap from your surgeon, use your regular soap. Do not shave or scrub the surgery site.  Wear clean pajamas and have clean sheets on your bed.

## 2020-02-18 NOTE — PROGRESS NOTES
Subjective     Drew Ennis is a 34 year old male who presents to clinic today for the following health issues:    Patient wanting acupuncture today     HPI   Right low back pain with right-sided sciatic symptoms.  Had auricular acupuncture done last week with improvement of pain from 8 out of 10 to 3 out of 10 for least 1 week.  He would like repeat acupuncture today.      Patient Active Problem List   Diagnosis     Morbid obesity (H)     History reviewed. No pertinent surgical history.    Social History     Tobacco Use     Smoking status: Never Smoker     Smokeless tobacco: Never Used   Substance Use Topics     Alcohol use: Not Currently     Family History   Problem Relation Age of Onset     Diabetes Father      Diabetes Paternal Grandfather      Cancer Mother          Current Outpatient Medications   Medication Sig Dispense Refill     acetaminophen (TYLENOL) 325 MG tablet Take 650 mg by mouth every 6 hours as needed for mild pain       cyclobenzaprine (FLEXERIL) 10 MG tablet Take 1 tablet (10 mg) by mouth 3 times daily as needed for muscle spasms 30 tablet 1     gabapentin (NEURONTIN) 300 MG capsule Take 1 capsule (300 mg) by mouth 3 times daily 60 capsule 1     ibuprofen 200 MG PO capsule        Ketorolac Tromethamine (TORADOL IM)        meloxicam (MOBIC) 15 MG tablet TAKE 1 TABLET BY MOUTH EVERY DAY 60 tablet 0     No Known Allergies  Recent Labs   Lab Test 01/22/20  1016 12/02/19  1715   LDL  --  153*   HDL  --  38*   TRIG  --  242*   ALT 90*  --    CR 0.72  --    GFRESTIMATED >90  --    GFRESTBLACK >90  --    POTASSIUM 4.2  --       BP Readings from Last 3 Encounters:   02/18/20 (!) 176/134   02/17/20 (!) 160/118   01/29/20 (!) 140/78    Wt Readings from Last 3 Encounters:   02/18/20 133.4 kg (294 lb 1.6 oz)   02/17/20 134.7 kg (297 lb)   01/29/20 131.5 kg (290 lb)                 Reviewed and updated as needed this visit by Provider         Review of Systems   ROS COMP: Constitutional, HEENT,  "cardiovascular, pulmonary, gi and gu systems are negative, except as otherwise noted.      Objective    BP (!) 177/129 (BP Location: Left arm, Patient Position: Sitting, Cuff Size: Adult Large)   Pulse 111   Temp 97.7  F (36.5  C) (Temporal)   Resp 16   Ht 1.844 m (6' 0.6\")   Wt 133.4 kg (294 lb 1.6 oz)   SpO2 98%   BMI 39.23 kg/m    Body mass index is 39.23 kg/m .  Physical Exam   GENERAL: healthy, alert and no distress  EYES: Eyes grossly normal to inspection, PERRL and conjunctivae and sclerae normal  HENT: ear canals and TM's normal, nose and mouth without ulcers or lesions  NECK: no adenopathy, no asymmetry, masses, or scars and thyroid normal to palpation  RESP: lungs clear to auscultation - no rales, rhonchi or wheezes  CV: regular rate and rhythm, normal S1 S2, no S3 or S4, no murmur, click or rub, no peripheral edema and peripheral pulses strong  ABDOMEN: soft, nontender, no hepatosplenomegaly, no masses and bowel sounds normal  MS: no gross musculoskeletal defects noted, no edema  NEURO: Normal strength and tone, mentation intact and speech normal  PSYCH: mentation appears normal, affect normal/bright    34 year old male here for auricular acupuncture procedure.  We discussed current diagnosis, the benefits and limitations of auricular acupuncture, and the risks of the procedure to include bleeding, infection, lack of response.  Obtained written consent.  Outer ear was cleansed with alcohol.  Gold ASP pins were placed using a Pointer Plus in the following points: [place] Men, Point [place], Tranquilizer, amygdala, hypothalamus, hippocampus, low back, master cerebral on the right, and tranquilizer, low back on left.  Patient was advised that pins will follow-up in the next 3 to 5 days.  Overall response to the procedure was very positive with a reduction in pain from 9/10 to 4/10.  Patient was discharged in stable condition with wound care and follow up instructions.  I spent at least 15 minutes face to " face with the patient throughout this 40 minute appointment.          ASSESSMENT and PLAN  1. Acute right-sided low back pain with right-sided sciatica  No improvement in low back pain with most conservative management.  He did have improvement from acupuncture last visit.  See below.  He was also approved for upcoming surgery in 2 weeks for proposed laminectomy.  - ACUPUNCTURE, 1+ NEEDLES, W/O ELECTRICAL STIM; INIT 15 MIN PERSONAL CONTACT    2. .  Hypertension  Patient with continued elevated blood pressure, despite acupuncture.  A good portion of his elevated blood pressure likely secondary to pain.  Given this we will treat with amlodipine.  Follow-up in 4 days for blood pressure recheck.  We will need to get better control of his blood pressure prior to the surgery.         Return in about 3 days (around 2/21/2020) for BP Recheck.     Francisco Bello MD, FAAFP  Family Medicine Physician  [facility]  [address] [place] [address]

## 2020-02-18 NOTE — PROGRESS NOTES
[facility] [place]  [address]  [place] [address]  [phone]  Dept: [phone]    PRE-OP EVALUATION:  Today's date: 2020    Drew Ennis (: 1985) presents for pre-operative evaluation assessment as requested by Dr. Can.  He requires evaluation and anesthesia risk assessment prior to undergoing surgery/procedure for treatment of Microdiscectomy .    Fax number for surgical facility: [phone]  Primary Physician: Francisco Bello  Type of Anesthesia Anticipated: to be determined    Patient has a Health Care Directive or Living Will:  NO    Preop Questions 2020   Who is doing your surgery? Dr. Can   What are you having done? Microdiscectomy   Date of Surgery/Procedure: 3/4/2020   Facility or Hospital where procedure/surgery will be performed: [facility]   1.  Do you have a history of Heart attack, stroke, stent, coronary bypass surgery, or other heart surgery? No   2.  Do you ever have any pain or discomfort in your chest? No   3.  Do you have a history of  Heart Failure? No   4.   Are you troubled by shortness of breath when:  walking on a level surface, or up a slight hill, or at night? No   5.  Do you currently have a cold, bronchitis or other respiratory infection? No   6.  Do you have a cough, shortness of breath, or wheezing? No   7.  Do you sometimes get pains in the calves of your legs when you walk? YES -no history of peripheral vascular disease   8. Do you or anyone in your family have previous history of blood clots? No   9.  Do you or does anyone in your family have a serious bleeding problem such as prolonged bleeding following surgeries or cuts? No   10. Have you ever had problems with anemia or been told to take iron pills? No   11. Have you had any abnormal blood loss such as black, tarry or bloody stools? No   12. Have you ever had a blood transfusion? No   13. Have you or any of your relatives ever had problems with anesthesia?  No   14. Do you have sleep apnea, excessive snoring or daytime drowsiness? No   15. Do you have any prosthetic heart valves? No   16. Do you have prosthetic joints? No         HPI:     HPI related to upcoming procedure: Ongoing right lower back pain with right-sided sciatic symptoms, scheduled laminectomy with neurosurgery.      See problem list for active medical problems.  Problems all longstanding and stable, except as noted/documented.  See ROS for pertinent symptoms related to these conditions.      MEDICAL HISTORY:     Patient Active Problem List    Diagnosis Date Noted     Morbid obesity (H) 11/13/2019     Priority: Medium      History reviewed. No pertinent past medical history.  History reviewed. No pertinent surgical history.  Current Outpatient Medications   Medication Sig Dispense Refill     acetaminophen (TYLENOL) 325 MG tablet Take 650 mg by mouth every 6 hours as needed for mild pain       cyclobenzaprine (FLEXERIL) 10 MG tablet Take 1 tablet (10 mg) by mouth 3 times daily as needed for muscle spasms 30 tablet 1     gabapentin (NEURONTIN) 300 MG capsule Take 1 capsule (300 mg) by mouth 3 times daily 60 capsule 1     ibuprofen 200 MG PO capsule        Ketorolac Tromethamine (TORADOL IM)        meloxicam (MOBIC) 15 MG tablet TAKE 1 TABLET BY MOUTH EVERY DAY 60 tablet 0     OTC products: None, except as noted above    No Known Allergies   Latex Allergy: NO    Social History     Tobacco Use     Smoking status: Never Smoker     Smokeless tobacco: Never Used   Substance Use Topics     Alcohol use: Not Currently     History   Drug Use Unknown       REVIEW OF SYSTEMS:   CONSTITUTIONAL: NEGATIVE for fever, chills, change in weight  INTEGUMENTARY/SKIN: NEGATIVE for worrisome rashes, moles or lesions  EYES: NEGATIVE for vision changes or irritation  ENT/MOUTH: NEGATIVE for ear, mouth and throat problems  RESP: NEGATIVE for significant cough or SOB  BREAST: NEGATIVE for masses, tenderness or discharge  CV:  "NEGATIVE for chest pain, palpitations or peripheral edema  GI: NEGATIVE for nausea, abdominal pain, heartburn, or change in bowel habits  : NEGATIVE for frequency, dysuria, or hematuria  MUSCULOSKELETAL:back pain  NEURO: NEGATIVE for weakness, dizziness or paresthesias  ENDOCRINE: NEGATIVE for temperature intolerance, skin/hair changes  HEME: NEGATIVE for bleeding problems  PSYCHIATRIC: NEGATIVE for changes in mood or affect    EXAM:   BP (!) 177/129 (BP Location: Left arm, Patient Position: Sitting, Cuff Size: Adult Large)   Pulse 111   Temp 97.7  F (36.5  C) (Temporal)   Resp 16   Ht 1.844 m (6' 0.6\")   Wt 133.4 kg (294 lb 1.6 oz)   SpO2 98%   BMI 39.23 kg/m      GENERAL APPEARANCE: alert, active and over weight     EYES: EOMI,  PERRL     HENT: ear canals and TM's normal and nose and mouth without ulcers or lesions     NECK: no adenopathy, no asymmetry, masses, or scars and thyroid normal to palpation     RESP: lungs clear to auscultation - no rales, rhonchi or wheezes     CV: regular rates and rhythm, normal S1 S2, no S3 or S4 and no murmur, click or rub     ABDOMEN:  soft, nontender, no HSM or masses and bowel sounds normal     MS: extremities normal- no gross deformities noted and spine, decreased range of motion secondary to pain flexion     SKIN: no suspicious lesions or rashes     NEURO: Normal strength and tone, sensory exam grossly normal, mentation intact and speech normal     PSYCH: mentation appears normal. and affect normal/bright     LYMPHATICS: No cervical adenopathy    DIAGNOSTICS:   EKG: appears normal, NSR, unchanged from previous tracings    Recent Labs   Lab Test 01/22/20  1016      POTASSIUM 4.2   CR 0.72        IMPRESSION:   Reason for surgery/procedure: Right lower back pain with right-sided radicular symptoms, planned laminectomy    The proposed surgical procedure is considered INTERMEDIATE risk.    REVISED CARDIAC RISK INDEX  The patient has the following serious " cardiovascular risks for perioperative complications such as (MI, PE, VFib and 3  AV Block):  No serious cardiac risks  INTERPRETATION: 2 risks: Class III (moderate risk - 6.6% complication rate)    The patient has the following additional risks for perioperative complications:  Morbid obesity  Hypertension    ASSESSMENT and PLAN    1. Preop general physical exam  34-year-old male with right-sided low back pain, right-sided sciatic symptoms.  No improvement with conservative management.  He has planned treatment by neurosurgery in 2 weeks.  Examination today showed obesity, elevated blood pressure-likely secondary to pain.  Cleared to proceed with procedure.    2.  Hypertension  Patient with continued elevated blood pressure, despite acupuncture.  A good portion of his elevated blood pressure likely secondary to pain.  Given this we will treat with amlodipine.  Follow-up in 4 days for blood pressure recheck.  We will need to get better control of his blood pressure prior to the surgery.    Return in about 3 days (around 2/21/2020) for BP Recheck.     Francisco Bello MD, FAAFP  Family Medicine Physician  [facility] [place]  [id number] [place] [id number]      RECOMMENDATIONS:     --Consult hospital rounder / IM to assist post-op medical management    --Patient is to take all scheduled medications on the day of surgery EXCEPT for modifications listed below.    APPROVAL GIVEN to proceed with proposed procedure, pending reevaluation of blood pressure       Signed Electronically by: Francisco Bello MD    Copy of this evaluation report is provided to requesting physician.    [facility] Preop Guidelines    Revised Cardiac Risk Index

## 2020-02-19 LAB
ALBUMIN SERPL-MCNC: 4.3 G/DL (ref 3.4–5)
ALP SERPL-CCNC: 64 U/L (ref 40–150)
ALT SERPL W P-5'-P-CCNC: 88 U/L (ref 0–70)
ANION GAP SERPL CALCULATED.3IONS-SCNC: 6 MMOL/L (ref 3–14)
AST SERPL W P-5'-P-CCNC: 39 U/L (ref 0–45)
BILIRUB SERPL-MCNC: 0.4 MG/DL (ref 0.2–1.3)
BUN SERPL-MCNC: 16 MG/DL (ref 7–30)
CALCIUM SERPL-MCNC: 9.8 MG/DL (ref 8.5–10.1)
CHLORIDE SERPL-SCNC: 103 MMOL/L (ref 94–109)
CO2 SERPL-SCNC: 28 MMOL/L (ref 20–32)
CREAT SERPL-MCNC: 0.88 MG/DL (ref 0.66–1.25)
GFR SERPL CREATININE-BSD FRML MDRD: >90 ML/MIN/{1.73_M2}
GLUCOSE SERPL-MCNC: 120 MG/DL (ref 70–99)
POTASSIUM SERPL-SCNC: 4 MMOL/L (ref 3.4–5.3)
PROT SERPL-MCNC: 7.8 G/DL (ref 6.8–8.8)
SODIUM SERPL-SCNC: 137 MMOL/L (ref 133–144)

## 2020-02-19 NOTE — RESULT ENCOUNTER NOTE
Drew,  Your recent studies are stable from previous studies.  Continue with plan to follow-up for blood pressure check.  Please let me know if you have any questions or concerns and follow up as discussed in clinic.    Sincerely.  Dr. SWATHI Bello MD, FAAFP  Family Medicine Physician  [facility] [place]  [address], [place] [address]

## 2020-02-21 ENCOUNTER — ANCILLARY PROCEDURE (OUTPATIENT)
Dept: MRI IMAGING | Facility: CLINIC | Age: 35
End: 2020-02-21
Attending: NEUROLOGICAL SURGERY
Payer: COMMERCIAL

## 2020-02-21 ENCOUNTER — ALLIED HEALTH/NURSE VISIT (OUTPATIENT)
Dept: FAMILY MEDICINE | Facility: CLINIC | Age: 35
End: 2020-02-21
Payer: COMMERCIAL

## 2020-02-21 VITALS — HEART RATE: 96 BPM | SYSTOLIC BLOOD PRESSURE: 160 MMHG | DIASTOLIC BLOOD PRESSURE: 112 MMHG

## 2020-02-21 DIAGNOSIS — Z01.30 BLOOD PRESSURE CHECK: Primary | ICD-10-CM

## 2020-02-21 DIAGNOSIS — M54.41 ACUTE RIGHT-SIDED LOW BACK PAIN WITH RIGHT-SIDED SCIATICA: ICD-10-CM

## 2020-02-21 PROCEDURE — 72148 MRI LUMBAR SPINE W/O DYE: CPT | Performed by: RADIOLOGY

## 2020-02-21 PROCEDURE — 99207 ZZC NO CHARGE NURSE ONLY: CPT

## 2020-02-21 NOTE — PROGRESS NOTES
Drew Ennis is a 34 year old patient who comes in today for a Blood Pressure check.  Second BP:  BP (!) 160/112 (BP Location: Left arm, Patient Position: Sitting, Cuff Size: Adult Large)   Pulse 96    Initial BP  168/118 left arm sitting adult large   Data Unavailable  Disposition: provider notified while patient in the clinic    Huddled with provider. Provider increased dose to 10mg once a day and ajay BP Tuesday. Appt scheduled for BP ajay. Patient reports no symptoms at this time. Per provider if he develops symptoms go to ER headaches or dizziness. Patient understands and agress with plan.       Sedrick Ayala MA on 2/21/2020 at 3:32 PM

## 2020-02-25 ENCOUNTER — MYC MEDICAL ADVICE (OUTPATIENT)
Dept: FAMILY MEDICINE | Facility: CLINIC | Age: 35
End: 2020-02-25

## 2020-02-25 ENCOUNTER — ALLIED HEALTH/NURSE VISIT (OUTPATIENT)
Dept: FAMILY MEDICINE | Facility: CLINIC | Age: 35
End: 2020-02-25
Payer: COMMERCIAL

## 2020-02-25 VITALS — HEART RATE: 113 BPM | SYSTOLIC BLOOD PRESSURE: 148 MMHG | DIASTOLIC BLOOD PRESSURE: 102 MMHG

## 2020-02-25 DIAGNOSIS — Z01.30 BP CHECK: Primary | ICD-10-CM

## 2020-02-25 DIAGNOSIS — I10 HYPERTENSION, UNSPECIFIED TYPE: Primary | ICD-10-CM

## 2020-02-25 PROCEDURE — 99207 ZZC NO CHARGE NURSE ONLY: CPT

## 2020-02-25 NOTE — NURSING NOTE
Drew Ennis is a 34 year old patient who comes in today for a Blood Pressure check.  Initial BP:  BP (!) 148/102   Pulse 113      113  Disposition: follow-up as previously indicated by provider      Huddled with Dr. Bello. Patient advised to schedule office visit next week for Blood pressure.

## 2020-02-26 RX ORDER — HYDROCHLOROTHIAZIDE 12.5 MG/1
12.5 TABLET ORAL DAILY
Qty: 60 TABLET | Refills: 1 | Status: SHIPPED | OUTPATIENT
Start: 2020-02-26 | End: 2020-06-15

## 2020-02-27 DIAGNOSIS — M51.26 HNP (HERNIATED NUCLEUS PULPOSUS), LUMBAR: Primary | ICD-10-CM

## 2020-02-27 RX ORDER — CEFAZOLIN SODIUM IN 0.9 % NACL 3 G/100 ML
3 INTRAVENOUS SOLUTION, PIGGYBACK (ML) INTRAVENOUS
Status: CANCELLED | OUTPATIENT
Start: 2020-02-27

## 2020-03-02 NOTE — PROGRESS NOTES
Subjective     Drew Ennis is a 34 year old male who presents to clinic today for the following health issues:    History of Present Illness        Back Pain:  He presents for follow up of back pain. Patient's back pain is a recurring problem.  Location of back pain:  Right lower back, right buttock and right side of waist  Description of back pain: gnawing and sharp  Back pain spreads: right knee    Since patient first noticed back pain, pain is: gradually improving  Does back pain interfere with his job:  Yes      Hypertension: He presents for follow up of hypertension.  He does check blood pressure  regularly outside of the clinic. Outside blood pressures have been over 140/90. He follows a low salt diet.     He eats 2-3 servings of fruits and vegetables daily.He consumes 1 sweetened beverage(s) daily.He exercises with enough effort to increase his heart rate 30 to 60 minutes per day.  He exercises with enough effort to increase his heart rate 4 days per week.   He is taking medications regularly.         Hospital Follow-up Visit:    Hospital/Nursing Home/IP Rehab Facility: [facility]  Date of Admission: 3/4/2020  Reason(s) for Admission: Lumbar Laminectomy. Right sided Sciatica.             Problems taking medications regularly:         Medication changes since discharge:        Problems adhering to non-medication therapy:      Summary of hospitalization:  [facility] discharge summary reviewed  Diagnostic Tests/Treatments reviewed.  Follow up needed: Follow-up with surgeon in 2 weeks  Other Healthcare Providers Involved in Patient s Care:         Surgical follow-up appointment - 2 weeks and Physical Therapy  Update since discharge: improved.     Post Discharge Medication Reconciliation: discharge medications reconciled, continue medications without change.  Plan of care communicated with patient     Coding guidelines for this visit:  Type of Medical   Decision Making Face-to-Face  Visit       within 7 Days of discharge Face-to-Face Visit        within 14 days of discharge   Moderate Complexity [id number]   High Complexity [id number]            Patient Active Problem List   Diagnosis     Morbid obesity (H)     Essential hypertension     Past Surgical History:   Procedure Laterality Date     APPENDECTOMY       DISCECTOMY LUMBAR POSTERIOR MICROSCOPIC ONE LEVEL Right 3/4/2020    Procedure: RIGHT L4-L5 HEMILAMINECTOMY AND MICRODISCECTOMY;  Surgeon: Agus Can MD;  Location:  OR     UPPER GI ENDOSCOPY         Social History     Tobacco Use     Smoking status: Never Smoker     Smokeless tobacco: Never Used     Tobacco comment: none    Substance Use Topics     Alcohol use: Not Currently     Comment: none      Family History   Problem Relation Age of Onset     Diabetes Father      Diabetes Paternal Grandfather      Cancer Mother          Current Outpatient Medications   Medication Sig Dispense Refill     Acetaminophen (TYLENOL PO) 4,000 PER DAY       amLODIPine (NORVASC) 10 MG tablet Take 1 tablet (10 mg) by mouth daily 90 tablet 1     cyclobenzaprine (FLEXERIL) 10 MG tablet Take 1 tablet (10 mg) by mouth 3 times daily as needed for muscle spasms 40 tablet 1     gabapentin (NEURONTIN) 300 MG capsule Take 1 capsule (300 mg) by mouth 3 times daily 60 capsule 1     hydrochlorothiazide (HYDRODIURIL) 12.5 MG tablet Take 1 tablet (12.5 mg) by mouth daily 60 tablet 1     oxyCODONE (ROXICODONE) 5 MG tablet Take 1 tablet (5 mg) by mouth every 4 hours as needed for pain 40 tablet 0     Allergies   Allergen Reactions     Compazine [Prochlorperazine]      unknown     Recent Labs   Lab Test 02/18/20  1547 01/22/20  1016 12/02/19  1715   LDL  --   --  153*   HDL  --   --  38*   TRIG  --   --  242*   ALT 88* 90*  --    CR 0.88 0.72  --    GFRESTIMATED >90 >90  --    GFRESTBLACK >90 >90  --    POTASSIUM 4.0 4.2  --       BP Readings from Last 3 Encounters:   03/06/20 132/74   03/04/20 116/68    02/25/20 (!) 148/102    Wt Readings from Last 3 Encounters:   03/06/20 136.1 kg (300 lb)   03/04/20 133.7 kg (294 lb 11.2 oz)   02/18/20 133.4 kg (294 lb 1.6 oz)               Reviewed and updated as needed this visit by Provider         Review of Systems   ROS COMP: Constitutional, HEENT, cardiovascular, pulmonary, gi and gu systems are negative, except as otherwise noted.      Objective    /74   Pulse 98   Temp 98  F (36.7  C) (Temporal)   Resp 16   Ht 1.829 m (6')   Wt 136.1 kg (300 lb)   SpO2 96%   BMI 40.69 kg/m    Body mass index is 40.69 kg/m .  Physical Exam   GENERAL: healthy, alert and no distress  RESP: lungs clear to auscultation - no rales, rhonchi or wheezes  CV: regular rate and rhythm, normal S1 S2, no S3 or S4, no murmur, click or rub, no peripheral edema and peripheral pulses strong  MS: no gross musculoskeletal defects noted, no edema  Back- incision covered by bandage, clean dry intact and nontender    Diagnostic Test Results:  Labs reviewed in Epic        Assessment & Plan     1. Acute right-sided low back pain with right-sided sciatica  Status post right L4-L5 hemilaminectomy and microdiscectomy, right lower extremity symptoms improved.  He has some continued pain near the incision, but overall much improved.  He will follow-up with surgeon in 2 weeks, determine when he can resume physical therapy at that time.    2. Essential hypertension  Blood pressure currently at goal.  Continue current medication to include amlodipine and hydrochlorothiazide.  - amLODIPine (NORVASC) 10 MG tablet; Take 1 tablet (10 mg) by mouth daily  Dispense: 90 tablet; Refill: 1    3. Morbid obesity (H)  Once he is cleared by his surgeon, we will start to increase activity for weight reduction.      Return in about 3 months (around 6/6/2020) for Follow Up from Today's Encounter.    Francisco Bello MD  [facility]

## 2020-03-03 ENCOUNTER — ANESTHESIA EVENT (OUTPATIENT)
Dept: SURGERY | Facility: CLINIC | Age: 35
End: 2020-03-03
Payer: COMMERCIAL

## 2020-03-04 ENCOUNTER — HOSPITAL ENCOUNTER (OUTPATIENT)
Facility: CLINIC | Age: 35
Discharge: HOME | End: 2020-03-04
Attending: NEUROLOGICAL SURGERY | Admitting: NEUROLOGICAL SURGERY
Payer: COMMERCIAL

## 2020-03-04 ENCOUNTER — APPOINTMENT (OUTPATIENT)
Dept: GENERAL RADIOLOGY | Facility: CLINIC | Age: 35
End: 2020-03-04
Attending: NEUROLOGICAL SURGERY
Payer: COMMERCIAL

## 2020-03-04 ENCOUNTER — ANESTHESIA (OUTPATIENT)
Dept: SURGERY | Facility: CLINIC | Age: 35
End: 2020-03-04
Payer: COMMERCIAL

## 2020-03-04 VITALS
RESPIRATION RATE: 16 BRPM | WEIGHT: 294.7 LBS | SYSTOLIC BLOOD PRESSURE: 116 MMHG | HEIGHT: 72 IN | TEMPERATURE: 97.1 F | BODY MASS INDEX: 39.91 KG/M2 | HEART RATE: 79 BPM | OXYGEN SATURATION: 96 % | DIASTOLIC BLOOD PRESSURE: 68 MMHG

## 2020-03-04 DIAGNOSIS — M54.41 ACUTE RIGHT-SIDED LOW BACK PAIN WITH RIGHT-SIDED SCIATICA: ICD-10-CM

## 2020-03-04 DIAGNOSIS — Z98.890 S/P LUMBAR LAMINECTOMY: Primary | ICD-10-CM

## 2020-03-04 PROCEDURE — 27210794 ZZH OR GENERAL SUPPLY STERILE: Performed by: NEUROLOGICAL SURGERY

## 2020-03-04 PROCEDURE — 25000128 H RX IP 250 OP 636: Performed by: NURSE ANESTHETIST, CERTIFIED REGISTERED

## 2020-03-04 PROCEDURE — 40000170 ZZH STATISTIC PRE-PROCEDURE ASSESSMENT II: Performed by: NEUROLOGICAL SURGERY

## 2020-03-04 PROCEDURE — 25000128 H RX IP 250 OP 636: Performed by: NEUROLOGICAL SURGERY

## 2020-03-04 PROCEDURE — 25800030 ZZH RX IP 258 OP 636: Performed by: NURSE ANESTHETIST, CERTIFIED REGISTERED

## 2020-03-04 PROCEDURE — 25000132 ZZH RX MED GY IP 250 OP 250 PS 637: Performed by: NEUROLOGICAL SURGERY

## 2020-03-04 PROCEDURE — 37000009 ZZH ANESTHESIA TECHNICAL FEE, EACH ADDTL 15 MIN: Performed by: NEUROLOGICAL SURGERY

## 2020-03-04 PROCEDURE — 25000125 ZZHC RX 250: Performed by: NEUROLOGICAL SURGERY

## 2020-03-04 PROCEDURE — 25000125 ZZHC RX 250: Performed by: NURSE ANESTHETIST, CERTIFIED REGISTERED

## 2020-03-04 PROCEDURE — 36000063 ZZH SURGERY LEVEL 4 EA 15 ADDTL MIN: Performed by: NEUROLOGICAL SURGERY

## 2020-03-04 PROCEDURE — 25000566 ZZH SEVOFLURANE, EA 15 MIN: Performed by: NEUROLOGICAL SURGERY

## 2020-03-04 PROCEDURE — 71000027 ZZH RECOVERY PHASE 2 EACH 15 MINS: Performed by: NEUROLOGICAL SURGERY

## 2020-03-04 PROCEDURE — 63030 LAMOT DCMPRN NRV RT 1 LMBR: CPT | Mod: 22 | Performed by: NEUROLOGICAL SURGERY

## 2020-03-04 PROCEDURE — 40000277 XR SURGERY CARM FLUORO LESS THAN 5 MIN W STILLS

## 2020-03-04 PROCEDURE — 25000128 H RX IP 250 OP 636: Performed by: ANESTHESIOLOGY

## 2020-03-04 PROCEDURE — 37000008 ZZH ANESTHESIA TECHNICAL FEE, 1ST 30 MIN: Performed by: NEUROLOGICAL SURGERY

## 2020-03-04 PROCEDURE — 71000012 ZZH RECOVERY PHASE 1 LEVEL 1 FIRST HR: Performed by: NEUROLOGICAL SURGERY

## 2020-03-04 PROCEDURE — 36000065 ZZH SURGERY LEVEL 4 W FLUORO 1ST 30 MIN: Performed by: NEUROLOGICAL SURGERY

## 2020-03-04 RX ORDER — PROPOFOL 10 MG/ML
INJECTION, EMULSION INTRAVENOUS PRN
Status: DISCONTINUED | OUTPATIENT
Start: 2020-03-04 | End: 2020-03-04

## 2020-03-04 RX ORDER — CYCLOBENZAPRINE HCL 10 MG
10 TABLET ORAL 3 TIMES DAILY PRN
Qty: 40 TABLET | Refills: 1 | Status: SHIPPED | OUTPATIENT
Start: 2020-03-04 | End: 2021-04-23

## 2020-03-04 RX ORDER — PROPOFOL 10 MG/ML
INJECTION, EMULSION INTRAVENOUS CONTINUOUS PRN
Status: DISCONTINUED | OUTPATIENT
Start: 2020-03-04 | End: 2020-03-04

## 2020-03-04 RX ORDER — FENTANYL CITRATE 50 UG/ML
25-50 INJECTION, SOLUTION INTRAMUSCULAR; INTRAVENOUS
Status: DISCONTINUED | OUTPATIENT
Start: 2020-03-04 | End: 2020-03-04 | Stop reason: HOSPADM

## 2020-03-04 RX ORDER — NALOXONE HYDROCHLORIDE 0.4 MG/ML
.1-.4 INJECTION, SOLUTION INTRAMUSCULAR; INTRAVENOUS; SUBCUTANEOUS
Status: DISCONTINUED | OUTPATIENT
Start: 2020-03-04 | End: 2020-03-04 | Stop reason: HOSPADM

## 2020-03-04 RX ORDER — FENTANYL CITRATE 50 UG/ML
INJECTION, SOLUTION INTRAMUSCULAR; INTRAVENOUS PRN
Status: DISCONTINUED | OUTPATIENT
Start: 2020-03-04 | End: 2020-03-04

## 2020-03-04 RX ORDER — NEOSTIGMINE METHYLSULFATE 1 MG/ML
VIAL (ML) INJECTION PRN
Status: DISCONTINUED | OUTPATIENT
Start: 2020-03-04 | End: 2020-03-04

## 2020-03-04 RX ORDER — CEFAZOLIN SODIUM 1 G/3ML
1 INJECTION, POWDER, FOR SOLUTION INTRAMUSCULAR; INTRAVENOUS EVERY 8 HOURS
Status: DISCONTINUED | OUTPATIENT
Start: 2020-03-04 | End: 2020-03-04

## 2020-03-04 RX ORDER — MEPERIDINE HYDROCHLORIDE 25 MG/ML
12.5 INJECTION INTRAMUSCULAR; INTRAVENOUS; SUBCUTANEOUS
Status: DISCONTINUED | OUTPATIENT
Start: 2020-03-04 | End: 2020-03-04 | Stop reason: HOSPADM

## 2020-03-04 RX ORDER — OXYCODONE HYDROCHLORIDE 5 MG/1
5 TABLET ORAL
Status: COMPLETED | OUTPATIENT
Start: 2020-03-04 | End: 2020-03-04

## 2020-03-04 RX ORDER — BUPIVACAINE HYDROCHLORIDE AND EPINEPHRINE 5; 5 MG/ML; UG/ML
INJECTION, SOLUTION PERINEURAL PRN
Status: DISCONTINUED | OUTPATIENT
Start: 2020-03-04 | End: 2020-03-04 | Stop reason: HOSPADM

## 2020-03-04 RX ORDER — HYDROMORPHONE HYDROCHLORIDE 1 MG/ML
.3-.5 INJECTION, SOLUTION INTRAMUSCULAR; INTRAVENOUS; SUBCUTANEOUS EVERY 10 MIN PRN
Status: DISCONTINUED | OUTPATIENT
Start: 2020-03-04 | End: 2020-03-04 | Stop reason: HOSPADM

## 2020-03-04 RX ORDER — LIDOCAINE HYDROCHLORIDE 20 MG/ML
INJECTION, SOLUTION INFILTRATION; PERINEURAL PRN
Status: DISCONTINUED | OUTPATIENT
Start: 2020-03-04 | End: 2020-03-04

## 2020-03-04 RX ORDER — SODIUM CHLORIDE, SODIUM LACTATE, POTASSIUM CHLORIDE, CALCIUM CHLORIDE 600; 310; 30; 20 MG/100ML; MG/100ML; MG/100ML; MG/100ML
INJECTION, SOLUTION INTRAVENOUS CONTINUOUS PRN
Status: DISCONTINUED | OUTPATIENT
Start: 2020-03-04 | End: 2020-03-04

## 2020-03-04 RX ORDER — SODIUM CHLORIDE, SODIUM LACTATE, POTASSIUM CHLORIDE, CALCIUM CHLORIDE 600; 310; 30; 20 MG/100ML; MG/100ML; MG/100ML; MG/100ML
INJECTION, SOLUTION INTRAVENOUS CONTINUOUS
Status: DISCONTINUED | OUTPATIENT
Start: 2020-03-04 | End: 2020-03-04 | Stop reason: HOSPADM

## 2020-03-04 RX ORDER — GLYCOPYRROLATE 0.2 MG/ML
INJECTION, SOLUTION INTRAMUSCULAR; INTRAVENOUS PRN
Status: DISCONTINUED | OUTPATIENT
Start: 2020-03-04 | End: 2020-03-04

## 2020-03-04 RX ORDER — ONDANSETRON 2 MG/ML
4 INJECTION INTRAMUSCULAR; INTRAVENOUS EVERY 30 MIN PRN
Status: DISCONTINUED | OUTPATIENT
Start: 2020-03-04 | End: 2020-03-04 | Stop reason: HOSPADM

## 2020-03-04 RX ORDER — ONDANSETRON 4 MG/1
4 TABLET, ORALLY DISINTEGRATING ORAL EVERY 30 MIN PRN
Status: DISCONTINUED | OUTPATIENT
Start: 2020-03-04 | End: 2020-03-04 | Stop reason: HOSPADM

## 2020-03-04 RX ORDER — DEXAMETHASONE SODIUM PHOSPHATE 4 MG/ML
INJECTION, SOLUTION INTRA-ARTICULAR; INTRALESIONAL; INTRAMUSCULAR; INTRAVENOUS; SOFT TISSUE PRN
Status: DISCONTINUED | OUTPATIENT
Start: 2020-03-04 | End: 2020-03-04

## 2020-03-04 RX ORDER — CEFAZOLIN SODIUM IN 0.9 % NACL 3 G/100 ML
INTRAVENOUS SOLUTION, PIGGYBACK (ML) INTRAVENOUS PRN
Status: DISCONTINUED | OUTPATIENT
Start: 2020-03-04 | End: 2020-03-04

## 2020-03-04 RX ORDER — ONDANSETRON 2 MG/ML
INJECTION INTRAMUSCULAR; INTRAVENOUS PRN
Status: DISCONTINUED | OUTPATIENT
Start: 2020-03-04 | End: 2020-03-04

## 2020-03-04 RX ORDER — OXYCODONE HYDROCHLORIDE 5 MG/1
5 TABLET ORAL EVERY 4 HOURS PRN
Qty: 40 TABLET | Refills: 0 | Status: SHIPPED | OUTPATIENT
Start: 2020-03-04 | End: 2021-04-23

## 2020-03-04 RX ADMIN — PHENYLEPHRINE HYDROCHLORIDE 100 MCG: 10 INJECTION INTRAVENOUS at 12:56

## 2020-03-04 RX ADMIN — NEOSTIGMINE METHYLSULFATE 5 MG: 1 INJECTION, SOLUTION INTRAVENOUS at 14:05

## 2020-03-04 RX ADMIN — HYDROMORPHONE HYDROCHLORIDE 0.5 MG: 1 INJECTION, SOLUTION INTRAMUSCULAR; INTRAVENOUS; SUBCUTANEOUS at 14:57

## 2020-03-04 RX ADMIN — PHENYLEPHRINE HYDROCHLORIDE: 10 INJECTION INTRAVENOUS at 13:40

## 2020-03-04 RX ADMIN — FENTANYL CITRATE 50 MCG: 50 INJECTION, SOLUTION INTRAMUSCULAR; INTRAVENOUS at 12:26

## 2020-03-04 RX ADMIN — PHENYLEPHRINE HYDROCHLORIDE 100 MCG: 10 INJECTION INTRAVENOUS at 12:47

## 2020-03-04 RX ADMIN — MIDAZOLAM 2 MG: 1 INJECTION INTRAMUSCULAR; INTRAVENOUS at 12:16

## 2020-03-04 RX ADMIN — SODIUM CHLORIDE, POTASSIUM CHLORIDE, SODIUM LACTATE AND CALCIUM CHLORIDE: 600; 310; 30; 20 INJECTION, SOLUTION INTRAVENOUS at 13:11

## 2020-03-04 RX ADMIN — PHENYLEPHRINE HYDROCHLORIDE 100 MCG: 10 INJECTION INTRAVENOUS at 12:26

## 2020-03-04 RX ADMIN — PROPOFOL 30 MCG/KG/MIN: 10 INJECTION, EMULSION INTRAVENOUS at 12:30

## 2020-03-04 RX ADMIN — PHENYLEPHRINE HYDROCHLORIDE 100 MCG: 10 INJECTION INTRAVENOUS at 13:15

## 2020-03-04 RX ADMIN — PHENYLEPHRINE HYDROCHLORIDE 0.2 MCG/KG/MIN: 10 INJECTION INTRAVENOUS at 12:58

## 2020-03-04 RX ADMIN — Medication 3 G: at 12:45

## 2020-03-04 RX ADMIN — SODIUM CHLORIDE, POTASSIUM CHLORIDE, SODIUM LACTATE AND CALCIUM CHLORIDE: 600; 310; 30; 20 INJECTION, SOLUTION INTRAVENOUS at 12:16

## 2020-03-04 RX ADMIN — PHENYLEPHRINE HYDROCHLORIDE 100 MCG: 10 INJECTION INTRAVENOUS at 13:57

## 2020-03-04 RX ADMIN — DEXAMETHASONE SODIUM PHOSPHATE 4 MG: 4 INJECTION, SOLUTION INTRA-ARTICULAR; INTRALESIONAL; INTRAMUSCULAR; INTRAVENOUS; SOFT TISSUE at 12:25

## 2020-03-04 RX ADMIN — PHENYLEPHRINE HYDROCHLORIDE 100 MCG: 10 INJECTION INTRAVENOUS at 12:28

## 2020-03-04 RX ADMIN — FENTANYL CITRATE 50 MCG: 50 INJECTION, SOLUTION INTRAMUSCULAR; INTRAVENOUS at 12:23

## 2020-03-04 RX ADMIN — PHENYLEPHRINE HYDROCHLORIDE 100 MCG: 10 INJECTION INTRAVENOUS at 12:51

## 2020-03-04 RX ADMIN — ROCURONIUM BROMIDE 30 MG: 10 INJECTION INTRAVENOUS at 13:08

## 2020-03-04 RX ADMIN — OXYCODONE HYDROCHLORIDE 5 MG: 5 TABLET ORAL at 15:05

## 2020-03-04 RX ADMIN — ROCURONIUM BROMIDE 50 MG: 10 INJECTION INTRAVENOUS at 12:23

## 2020-03-04 RX ADMIN — HYDROMORPHONE HYDROCHLORIDE 0.5 MG: 1 INJECTION, SOLUTION INTRAMUSCULAR; INTRAVENOUS; SUBCUTANEOUS at 13:05

## 2020-03-04 RX ADMIN — PHENYLEPHRINE HYDROCHLORIDE 100 MCG: 10 INJECTION INTRAVENOUS at 12:43

## 2020-03-04 RX ADMIN — DEXMEDETOMIDINE HYDROCHLORIDE 8 MCG: 100 INJECTION, SOLUTION INTRAVENOUS at 13:51

## 2020-03-04 RX ADMIN — ONDANSETRON 4 MG: 2 INJECTION INTRAMUSCULAR; INTRAVENOUS at 12:25

## 2020-03-04 RX ADMIN — PROPOFOL 200 MG: 10 INJECTION, EMULSION INTRAVENOUS at 12:23

## 2020-03-04 RX ADMIN — PHENYLEPHRINE HYDROCHLORIDE 200 MCG: 10 INJECTION INTRAVENOUS at 12:54

## 2020-03-04 RX ADMIN — DEXMEDETOMIDINE HYDROCHLORIDE 12 MCG: 100 INJECTION, SOLUTION INTRAVENOUS at 13:11

## 2020-03-04 RX ADMIN — LIDOCAINE HYDROCHLORIDE 100 MG: 20 INJECTION, SOLUTION INFILTRATION; PERINEURAL at 12:23

## 2020-03-04 RX ADMIN — PHENYLEPHRINE HYDROCHLORIDE 100 MCG: 10 INJECTION INTRAVENOUS at 12:36

## 2020-03-04 RX ADMIN — GLYCOPYRROLATE 0.8 MG: 0.2 INJECTION, SOLUTION INTRAMUSCULAR; INTRAVENOUS at 14:05

## 2020-03-04 ASSESSMENT — MIFFLIN-ST. JEOR: SCORE: 2324.75

## 2020-03-04 ASSESSMENT — LIFESTYLE VARIABLES: TOBACCO_USE: 0

## 2020-03-04 ASSESSMENT — ENCOUNTER SYMPTOMS: SEIZURES: 0

## 2020-03-04 NOTE — ANESTHESIA POSTPROCEDURE EVALUATION
Patient: Drew Ennis    Procedure(s):  RIGHT L4-L5 HEMILAMINECTOMY AND MICRODISCECTOMY    Diagnosis:Acute back pain with sciatica, right [M54.41]  Diagnosis Additional Information: No value filed.    Anesthesia Type:  General, ETT    Note:  Anesthesia Post Evaluation    Patient location during evaluation: Phase 2  Patient participation: Able to fully participate in evaluation  Level of consciousness: awake and alert  Pain management: adequate  Airway patency: patent  Cardiovascular status: acceptable  Respiratory status: acceptable  Hydration status: acceptable  PONV: none     Anesthetic complications: None          Last vitals:  Vitals:    03/04/20 1500 03/04/20 1515 03/04/20 1609   BP: 110/63 112/66 116/68   Pulse: 89 79    Resp: 16 16 16   Temp: 36.2  C (97.1  F)     SpO2: 99% 96% 96%         Electronically Signed By: Rebekah Mc MD  March 4, 2020  5:08 PM

## 2020-03-04 NOTE — DISCHARGE INSTRUCTIONS
Same Day Surgery Discharge Instructions for  Sedation and General Anesthesia       It's not unusual to feel dizzy, light-headed or faint for up to 24 hours after surgery or while taking pain medication.  If you have these symptoms: sit for a few minutes before standing and have someone assist you when you get up to walk or use the bathroom.      You should rest and relax for the next 24 hours. We recommend you make arrangements to have an adult stay with you for at least 24 hours after your discharge.  Avoid hazardous and strenuous activity.      DO NOT DRIVE any vehicle or operate mechanical equipment for 24 hours following the end of your surgery.  Even though you may feel normal, your reactions may be affected by the medication you have received.      Do not drink alcoholic beverages for 24 hours following surgery.       Slowly progress to your regular diet as you feel able. It's not unusual to feel nauseated and/or vomit after receiving anesthesia.  If you develop these symptoms, drink clear liquids (apple juice, ginger ale, broth, 7-up, etc. ) until you feel better.  If your nausea and vomiting persists for 24 hours, please notify your surgeon.        All narcotic pain medications, along with inactivity and anesthesia, can cause constipation. Drinking plenty of liquids and increasing fiber intake will help.      For any questions of a medical nature, call your surgeon.      Do not make important decisions for 24 hours.      If you had general anesthesia, you may have a sore throat for a couple of days related to the breathing tube used during surgery.  You may use Cepacol lozenges to help with this discomfort.  If it worsens or if you develop a fever, contact your surgeon.       If you feel your pain is not well managed with the pain medications prescribed by your surgeon, please contact your surgeon's office to let them know so they can address your concerns.       **If you have questions or concerns about  your procedure,   call Dr. Can at [phone]**

## 2020-03-04 NOTE — OP NOTE
Name of procedure: Right L4-5 hemilaminectomy and microdiscectomy; use the operating microscope    Preoperative diagnosis: Extruded free fragment disc herniation at right L4-5 with right L5 radiculopathy refractory to prolonged conservative management    Postoperative diagnosis: Same    Surgeon: Agus Can MD    First assistant: None    Material forwarded to laboratory for examination: None    Description of the procedure: Patient was brought to the operating room where he was placed under suitable general endotracheal anesthesia and subsequently positioned in the prone position on lumbar rolls.  His lower back was sterilely prepped and draped in usual fashion and a right paramedian incision 1.8 cm in length was made at the L4-5 level as determined with lateral fluoroscopy.  Standard Metrix technique was used to place a 1.8 x 6 cm Metrix retractor tube at the right L4 hemilamina.  Tube was secured to the table using the articulated mechanical arm and final placement was verified with lateral and AP fluoroscopic views.  Operating microscope was brought into use and used throughout the remainder of the procedure for visualization, illumination and microsurgical technique.  Midas Farhan drill was used to perform a partial right L4 hemilaminectomy and minimal medial facetectomy.  Ligamentum flavum underlying was fenestrated and removed in the lateral aspect of the thecal sac and the descending left L5 nerve root shoulder were dissected free.  Dorsal and lateral decompression was performed down to the level of the pedicle of L5.  Exploration underneath the shoulder of the nerve root disclosed a large free fragment disc herniation once was removed and 2 large pieces.  There was immediate visible and palpable decompression of the transiting nerve root and ventral thecal sac.  Patient was noted to have partially calcified L4-5 disc with marginal spurring.  Small opening through which a fragment of disc had escaped was  evident.  This could not be opened or enlarged sufficiently to allow access to the center portion of the disc space.  At the conclusion of the procedure there was no evidence of residual neurologic impingement no additional nucleus pulposus could be removed from within the epidural space.  Dorsal decompression of the transiting right L5 nerve root was continued to just above the level of the disc space.  Wound was then copious irrigated with Ancef containing irrigation and meticulous hemostasis was assured with bipolar cautery and small pledgets of thrombin-soaked Gelfoam.  Metrix retractor tube was withdrawn and the wound was closed in layers using interrupted inverted 3-0 Vicryl suture with a running undyed 4-0 Vicryl in a subcuticular stitch for skin.  Sterile dressing was applied after infiltrating surrounding tissues with 10 cc of quarter percent Marcaine with epinephrine.  Patient was then returned to the supine position where he is awake and extubated and transported to recovery room in stable condition.

## 2020-03-04 NOTE — ANESTHESIA CARE TRANSFER NOTE
Patient: Drew Ennis    Procedure(s):  RIGHT L4-L5 HEMILAMINECTOMY AND MICRODISCECTOMY    Diagnosis: Acute back pain with sciatica, right [M54.41]  Diagnosis Additional Information: No value filed.    Anesthesia Type:   General, ETT     Note:  Airway :Face Mask  Patient transferred to:PACU  Handoff Report: Identifed the Patient, Identified the Reponsible Provider, Reviewed the pertinent medical history, Discussed the surgical course, Reviewed Intra-OP anesthesia mangement and issues during anesthesia, Set expectations for post-procedure period and Allowed opportunity for questions and acknowledgement of understanding      Vitals: (Last set prior to Anesthesia Care Transfer)    CRNA VITALS  3/4/2020 1347 - 3/4/2020 1423      3/4/2020             Resp Rate (set):  10                Electronically Signed By: NIECY Fowler CRNA  March 4, 2020  2:23 PM

## 2020-03-04 NOTE — ANESTHESIA PREPROCEDURE EVALUATION
Anesthesia Pre-Procedure Evaluation    Patient: Drew Ennis   MRN: [id number] : 1985          Preoperative Diagnosis: Acute back pain with sciatica, right [M54.41]    Procedure(s):  MICRODISCECTOMY, SPINE, LUMBAR, 1 LEVEL, POSTERIOR APPROACH    Past Medical History:   Diagnosis Date     Hypertension      Past Surgical History:   Procedure Laterality Date     APPENDECTOMY       UPPER GI ENDOSCOPY         Anesthesia Evaluation     . Pt has had prior anesthetic.     No history of anesthetic complications          ROS/MED HX    ENT/Pulmonary:      (-) tobacco use and sleep apnea   Neurologic:      (-) seizures   Cardiovascular:     (+) hypertension----. : . . . :. .       METS/Exercise Tolerance:  >4 METS   Hematologic:         Musculoskeletal: Comment: Back pain        GI/Hepatic:        (-) GERD and liver disease   Renal/Genitourinary:      (-) renal disease   Endo:     (+) Obesity (BMI 40), .   (-) Type II DM and thyroid disease   Psychiatric:         Infectious Disease:         Malignancy:         Other:                          Physical Exam  Normal systems: cardiovascular, pulmonary and dental    Airway   Mallampati: II  TM distance: >3 FB  Neck ROM: full    Dental     Cardiovascular       Pulmonary             Lab Results   Component Value Date    WBC 8.1 2020    HGB 16.4 2020    HCT 47.3 2020     2020     2020    POTASSIUM 4.0 2020    CHLORIDE 103 2020    CO2 28 2020    BUN 16 2020    CR 0.88 2020     (H) 2020    FRANCISCO J 9.8 2020    ALBUMIN 4.3 2020    PROTTOTAL 7.8 2020    ALT 88 (H) 2020    AST 39 2020    ALKPHOS 64 2020    BILITOTAL 0.4 2020       Preop Vitals  BP Readings from Last 3 Encounters:   20 (!) 148/106   20 (!) 148/102   20 (!) 160/112    Pulse Readings from Last 3 Encounters:   20 113   20 96   20 111      Resp Readings from Last  3 Encounters:   02/18/20 16   01/29/20 14   01/24/20 18    SpO2 Readings from Last 3 Encounters:   03/04/20 98%   02/18/20 98%   01/29/20 98%      Temp Readings from Last 1 Encounters:   03/04/20 36.3  C (97.4  F) (Oral)    Ht Readings from Last 1 Encounters:   03/04/20 1.829 m (6')      Wt Readings from Last 1 Encounters:   03/04/20 133.7 kg (294 lb 11.2 oz)    Estimated body mass index is 39.97 kg/m  as calculated from the following:    Height as of this encounter: 1.829 m (6').    Weight as of this encounter: 133.7 kg (294 lb 11.2 oz).       Anesthesia Plan      History & Physical Review  History and physical reviewed and following examination; no interval change.    ASA Status:  3 .    NPO Status:  > 8 hours    Plan for General and ETT with Intravenous induction. Maintenance will be Balanced.    PONV prophylaxis:  Ondansetron (or other 5HT-3) and Dexamethasone or Solumedrol  Additional equipment: Videolaryngoscope      Postoperative Care  Postoperative pain management:  Multi-modal analgesia.      Consents  Anesthetic plan, risks, benefits and alternatives discussed with:  Patient..                 Ruben Mora MD

## 2020-03-05 ENCOUNTER — TELEPHONE (OUTPATIENT)
Dept: NEUROSURGERY | Facility: CLINIC | Age: 35
End: 2020-03-05

## 2020-03-05 NOTE — TELEPHONE ENCOUNTER
Contacted patient for post-op follow up call. Patient is s/p Right L4-5 hemilaminectomy and microdiscectomy on 3/4/20 by Dr Can.     Advised to call our clinic with any post-op questions or concerns: [phone].

## 2020-03-06 ENCOUNTER — OFFICE VISIT (OUTPATIENT)
Dept: FAMILY MEDICINE | Facility: CLINIC | Age: 35
End: 2020-03-06
Payer: COMMERCIAL

## 2020-03-06 VITALS
DIASTOLIC BLOOD PRESSURE: 74 MMHG | HEIGHT: 72 IN | SYSTOLIC BLOOD PRESSURE: 132 MMHG | TEMPERATURE: 98 F | OXYGEN SATURATION: 96 % | RESPIRATION RATE: 16 BRPM | HEART RATE: 98 BPM | WEIGHT: 300 LBS | BODY MASS INDEX: 40.63 KG/M2

## 2020-03-06 DIAGNOSIS — M54.41 ACUTE RIGHT-SIDED LOW BACK PAIN WITH RIGHT-SIDED SCIATICA: Primary | ICD-10-CM

## 2020-03-06 DIAGNOSIS — E66.01 MORBID OBESITY (H): ICD-10-CM

## 2020-03-06 DIAGNOSIS — I10 ESSENTIAL HYPERTENSION: ICD-10-CM

## 2020-03-06 PROCEDURE — 99214 OFFICE O/P EST MOD 30 MIN: CPT | Performed by: FAMILY MEDICINE

## 2020-03-06 RX ORDER — AMLODIPINE BESYLATE 10 MG/1
10 TABLET ORAL DAILY
Qty: 90 TABLET | Refills: 1 | Status: SHIPPED | OUTPATIENT
Start: 2020-03-06 | End: 2021-04-23

## 2020-03-06 ASSESSMENT — MIFFLIN-ST. JEOR: SCORE: 2348.79

## 2020-03-16 ENCOUNTER — TELEPHONE (OUTPATIENT)
Dept: NEUROSURGERY | Facility: CLINIC | Age: 35
End: 2020-03-16

## 2020-03-16 NOTE — LETTER
[facility]  [address] [place] [address]  [id number]        March 16, 2020    To Whom it May Concern,      Drew Ennis is being seen at our clinic for post-operative follow up care. He is able to return to work with the restrictions of:    -Light duty work only.  -No heavy lifting greater than 10 pounds until further notice.  -Limited twisting, bending, or overhead reaching.    He will be re-evaluated at their next follow up visit. Please call our clinic with questions or concerns.       Sincerely,      Agus Can MD

## 2020-03-16 NOTE — TELEPHONE ENCOUNTER
Patient calling to request return to work letter. Pt understands restrictions and work is willing to accommodate. Pt is feeling good and would like to get back to work. Letter will be sent.     RN incision check visit cancelled for Wed 3/16. Reminder set to call patient this day to discuss any concerns.

## 2020-03-16 NOTE — TELEPHONE ENCOUNTER
Contacted patient for post-op follow up call. Patient is s/p RIGHT L4-L5 HEMILAMINECTOMY AND MICRODISCECTOMY by Dr. Can. Nursing calling patient to discuss post op 2 weeks post surgery.    No concerns at this time with incision.   Patient states his pain is ok. Nerve pain comes and goes- same as preoperative just not as   Tylenol prn. Muscle relaxant at noc.     Patient does not having any questions at this point and is comfortable with cancelling his 2 week visit and seeing Markus at 4 weeks. Will call with questions or concerns in the meantime.

## 2020-04-01 ENCOUNTER — TELEPHONE (OUTPATIENT)
Dept: NEUROSURGERY | Facility: CLINIC | Age: 35
End: 2020-04-01

## 2020-04-01 ENCOUNTER — OFFICE VISIT (OUTPATIENT)
Dept: NEUROSURGERY | Facility: CLINIC | Age: 35
End: 2020-04-01
Payer: COMMERCIAL

## 2020-04-01 VITALS — TEMPERATURE: 97.8 F

## 2020-04-01 DIAGNOSIS — Z98.890 S/P LUMBAR MICRODISCECTOMY: Primary | ICD-10-CM

## 2020-04-01 PROCEDURE — 99207 ZZC NO CHARGE NURSE ONLY: CPT

## 2020-04-01 PROCEDURE — 40000269 ZZH STATISTIC NO CHARGE FACILITY FEE

## 2020-04-01 ASSESSMENT — PAIN SCALES - GENERAL: PAINLEVEL: MILD PAIN (3)

## 2020-04-01 NOTE — PROGRESS NOTES
Nurse Visit Incision Check:    S:  Patient is s/p L4-5 hemilaminectomy and microdiscectomy on 3/4/20 with Dr. Can. Patient presents to clinic for incision check after speaking to RN this morning. Patient concerned about incisional redness, tenderness, and drainage that started a few days ago. Patient was instructed to come to clinic for evaluation. Patient reports minimal pain rating 3/10 located at the incision. Patient states the drainage is a small amount that he noticed on his shirt.   O:  Incision assessed by RN. Incision is clean, dry, intact, and healing well. Minimal redness around incision. No swelling. Patient afebrile today and denies any recent fevers.  A: Patient returned to clinic for incision check s/p L4-5 hemilaminectomy and microdiscectomy on 3/4/20. Incision is clean, dry, intact with no evidence of drainage or dehiscence.   P:    - Continue to monitor incision. Keep the area clean and dry. Call clinic with any new pain, swelling, fever, or increased drainage.  - Continue post op activity restrictions: No lifting greater than 10-15 pounds. Limited bending and twisting.  -Follow up on 4/15/20 for scheduled post op visit    -Please call clinic with any further questions or concerns: [phone]    Susanna Bell RN  [facility] Neurosurgery   [address] [address]    Tel [phone]

## 2020-04-01 NOTE — TELEPHONE ENCOUNTER
Patient called nurse line with incision concerns. Patient is s/p L4-5 hemilaminectomy and microdiscectomy on 3/4/20 with Dr. Can.     Patient reports incision is tender, inflamed, and has drainage. He also notes an opening at the bottom of the incision. He first noticed this a few days ago and he feels like it has worsened now. He thinks it may have happened when bending down at work.     Patient denies any fevers, SOB, cough, or fatigue.     Recommend patient come to  clinic for a nurse visit to evaluate incision. Patient states he is at work today so he will call back to see if he can come today or tomorrow at the latest. Awaiting call back.

## 2020-04-01 NOTE — PATIENT INSTRUCTIONS
- Continue to monitor incision. Keep the area clean and dry. Call clinic with any new pain, swelling, fever, or increased drainage.  - Continue post op activity restrictions: No lifting greater than 10-15 pounds. Limited bending and twisting.  -Follow up on 4/15/20 for scheduled post op visit    -Please call clinic with any further questions or concerns: [phone]    Susanna Bell RN  [facility] Neurosurgery   [address] [address]    Tel [phone]

## 2020-04-01 NOTE — TELEPHONE ENCOUNTER
Patient called back. He is able to come to  clinic today at 1:30 for incision check. Patient scheduled and advised to call back with any other questions/concerns.

## 2020-04-13 ENCOUNTER — VIRTUAL VISIT (OUTPATIENT)
Dept: NEUROSURGERY | Facility: CLINIC | Age: 35
End: 2020-04-13
Attending: PHYSICIAN ASSISTANT
Payer: COMMERCIAL

## 2020-04-13 DIAGNOSIS — Z98.890 S/P LUMBAR MICRODISCECTOMY: Primary | ICD-10-CM

## 2020-04-13 PROCEDURE — 99024 POSTOP FOLLOW-UP VISIT: CPT | Performed by: PHYSICIAN ASSISTANT

## 2020-04-13 NOTE — PROGRESS NOTES
"Drew Ennis is a 34 year old male who is being evaluated via a billable telephone visit.      Due to COVID-19 this visit has been performed over the phone verses face to face.     The patient has been notified of following:     \"This telephone visit will be conducted via a call between you and your physician/provider. We have found that certain health care needs can be provided without the need for a physical exam.  This service lets us provide the care you need with a short phone conversation.  If a prescription is necessary we can send it directly to your pharmacy.  If lab work is needed we can place an order for that and you can then stop by our lab to have the test done at a later time.    If during the course of the call the physician/provider feels a telephone visit is not appropriate, you will not be charged for this service.\"     Drew Ennis complains of    Chief Complaint   Patient presents with     Neurologic Problem     Follow up DOS 3/4/20       I have reviewed and updated the patient's Past Medical History, Social History, Family History and Medication List.    ALLERGIES  Compazine [prochlorperazine]    Additional provider notes:    Drew Ennis is a 34 year old male who is 6 weeks status post right L4-5 hemilaminectomy microdiscectomy.  He states he is feeling very well his right leg pain is resolved his weakness is resolved and he is back to his normal baseline.  He has no concerns at this time he states his incision is well-healed.  He is very pleased with his surgical outcome.    Assessment    6 weeks status post lumbar microdiscectomy at L4-5    Plan:    Patient can gradually increase his lifting back to his normal baseline amount over the next few weeks and gradually increase his activities as activities as well.  No further follow-up is needed at this time he will contact us in the future as needed if he has any concerns.    Phone call duration: 2 minutes  Start Time 3:22 PM  End Time " 3:24 PM    Postop follow-up visit no charge    Markus Thomas PA-C    Patient provided verbal consent for the phone visit today.

## 2020-05-16 ENCOUNTER — VIRTUAL VISIT (OUTPATIENT)
Dept: FAMILY MEDICINE | Facility: OTHER | Age: 35
End: 2020-05-16

## 2020-05-16 ENCOUNTER — OFFICE VISIT (OUTPATIENT)
Dept: URGENT CARE | Facility: URGENT CARE | Age: 35
End: 2020-05-16
Payer: COMMERCIAL

## 2020-05-16 DIAGNOSIS — Z20.822 SUSPECTED COVID-19 VIRUS INFECTION: Primary | ICD-10-CM

## 2020-05-16 PROCEDURE — 99207 ZZC NO BILLABLE SERVICE THIS VISIT: CPT

## 2020-05-16 PROCEDURE — 87635 SARS-COV-2 COVID-19 AMP PRB: CPT | Mod: 90 | Performed by: FAMILY MEDICINE

## 2020-05-16 PROCEDURE — 99000 SPECIMEN HANDLING OFFICE-LAB: CPT | Performed by: FAMILY MEDICINE

## 2020-05-16 NOTE — PROGRESS NOTES
"Date: 2020 12:37:00  Clinician: Angelica Cabral  Clinician NPI: [id number]  Patient: Drew Ennis  Patient : 1985  Patient Address: [address] [place] [address] [place] [address], [place] [address]  Patient Phone: [phone]  Visit Protocol: URI  Patient Summary:  Drew is a 34 year old ( : 1985 ) male who initiated a Visit for COVID-19 (Coronavirus) evaluation and screening. When asked the question \"Please sign me up to receive news, health information and promotions from [employer].\", Drew responded \"No\".    Drew states his symptoms started 1-2 days ago.   His symptoms consist of nausea, wheezing, a cough, nasal congestion, malaise, and myalgia. He is experiencing difficulty breathing due to nasal congestion but he is not short of breath. Drew also feels feverish.   Symptom details     Nasal secretions: The color of his mucus is white and clear.    Cough: Drew coughs every 5-10 minutes and his cough is not more bothersome at night. Phlegm does not come into his throat when he coughs. He does not believe his cough is caused by post-nasal drip.     Temperature: His current temperature is 99 degrees Fahrenheit.     Wheezing: Drew has not ever been diagnosed with asthma. The wheezing does not interfere with his normal daily activities.     Drew denies having teeth pain, ageusia, diarrhea, facial pain or pressure, chills, sore throat, enlarged lymph nodes, vomiting, rhinitis, ear pain, headache, and anosmia. He also denies having recent facial or sinus surgery in the past 60 days and taking antibiotic medication for the symptoms.   Precipitating events  He has not recently been exposed to someone with influenza. Drew has been in close contact with the following high risk individuals: people with asthma, heart disease or diabetes and adults 65 or older.   Pertinent COVID-19 (Coronavirus) information  In the past 14 days, Drew has not worked in a congregate living setting.   He does not work or volunteer as " healthcare worker or a  and does not work or volunteer in a healthcare facility.   Drew also has not lived in a congregate living setting in the past 14 days. He does not live with a healthcare worker.   Drew has had a close contact with a laboratory-confirmed COVID-19 patient within 14 days of symptom onset. Additional information about contact with COVID-19 (Coronavirus) patient as reported by the patient (free text): I was exposed at work to someone who was exposed to a confirmed covid-19 patient. I wasn't told who the individual was.   Pertinent medical history  Drew does not need a return to work/school note.   Weight: 295 lbs   Drew does not smoke or use smokeless tobacco.   Weight: 295 lbs    MEDICATIONS: hydrochlorothiazide oral, amlodipine oral, ALLERGIES: prochlorperazine  Clinician Response:  Dear Drew,    Dear Drew  Your symptoms show that you may have coronavirus (COVID-19). This illness can cause fever, cough and trouble breathing. Many people get a mild case and get better on their own. Some people can get very sick.  What should I do?  We would like to test you for this virus. This will be a curbside test done outside the clinic.  Please call [phone] to schedule your visit. Explain that you were referred by [facility] to have a COVID-19 test. Be ready to share your [facility] visit ID number.  Starting now:  Stay at least 6 feet away from others. (If someone will drive you to your test, stay in the backseat, as far away from the  as you can.)   Don't go to work, school or anywhere else. When it's time for your test, go straight to the testing site. Don't make any stops on the way there or back.   Wash your hands and face often. Use soap and water.   Cover your mouth and nose with a mask, tissue or washcloth.   Don't touch anyone. No hugging, kissing or handshakes.  While at home   Stay home and away from others (self-isolate) until:  You've had no fever---and no medicine that  "reduces fever---for 3 full days (72 hours). And...  Your other symptoms have gotten better. For example, your cough or breathing has improved. And...  At least 10 days have passed since your symptoms started.  During this time:  Stay in your own room (and use your own bathroom), if you can.  Don't go to work, school or anywhere else.  Stay away from others in your home. No hugging, kissing or shaking hands.  Don't let anyone visit.  Cover your mouth and nose with a mask, tissue or washcloth to avoid spreading germs.  Clean \"high touch\" surfaces often (doorknobs, counters, handles, etc.). Use a household cleaning spray or wipes.  Wash your hands and face often. Use soap and water.  How can I take care of myself?  1. Get lots of rest. Drink extra fluids (unless your doctor has told you not to).  2. Take Tylenol (acetaminophen) for fever or pain. If you have liver or kidney problems, ask your family doctor if it's okay to take Tylenol.  Adults can take either:   650 mg (two 325 mg pills) every 4 to 6 hours, or...  1,000 mg (two 500 mg pills) every 8 hours as needed.   Note: Don't take more than 3,000 mg in one day.   Acetaminophen is found in many medicines (both prescribed and over-the-counter medicines). Read all labels to be sure you don't take too much.   For children, check the Tylenol bottle for the right dose. The dose is based on the child's age or weight.  3. If you have other health problems (like cancer, heart failure, an organ transplant or severe kidney disease): Call your specialty clinic if you don't feel better in the next 2 days.  4. Know when to call 911: If your breathing is so bad that it keeps you from doing normal activities, call 911 or go to the emergency room. Tell them that you've been staying home and may have COVID-19.  5. Sign up for [facility]. We know it's scary to hear that you might have COVID-19. We want to track your symptoms to make sure you're okay over the next 2 weeks. Please " look for an email from [employer]---this is a free, online program that we'll use to keep in touch. To sign up, follow the link in the email. Learn more at [email or web address].  6. The following will serve as your written order for this Covid Test ordered by me for the indication of suspected Covid [Z20.828]: The test will be ordered in [employer], our electronic health record after you are scheduled and will show as ordered and authorized by Jose Resendez MD   Order: Covid-19 (Coronavirus) PCR for SYMPTOMATIC testing from [facility]  Where can I get more information?  To learn more about COVID-19 and how to care for yourself at home, please visit the CDC website at https://www.cdc.gov/coronavirus/2019-ncov/about/steps-when-sick.html.  For more about your care at [facility], please visit [email or web address]  If you'd like to be part of a COVID-19 clinical trial (research study) at the [facility], go to [email or web address] for details.      Diagnosis: Cough  Diagnosis ICD: R05

## 2020-05-17 LAB
SARS-COV-2 RNA SPEC QL NAA+PROBE: NOT DETECTED
SPECIMEN SOURCE: NORMAL

## 2020-06-11 ENCOUNTER — E-VISIT (OUTPATIENT)
Dept: FAMILY MEDICINE | Facility: CLINIC | Age: 35
End: 2020-06-11
Payer: COMMERCIAL

## 2020-06-11 DIAGNOSIS — A08.4 VIRAL GASTROENTERITIS: Primary | ICD-10-CM

## 2020-06-11 PROCEDURE — 99421 OL DIG E/M SVC 5-10 MIN: CPT | Performed by: FAMILY MEDICINE

## 2020-06-11 NOTE — PATIENT INSTRUCTIONS
"    Viral Diarrhea (Adult)    Diarrhea caused by a virus is often called viral gastroenteritis. Many people call it the \"stomach flu,\" but it has nothing to do with influenza. The virus that causes diarrhea affects the stomach and intestinal tract and usually lasts from 2 to 7 days. Diarrhea is the passing of loose, watery stools 3 or more times a day.  Symptoms  Along with diarrhea, you may have these symptoms:    Abdominal pain and cramping    Nausea and vomiting    Loss of bowel control    Fever and chills    Bloody stools  The danger from repeated diarrhea is dehydration. Dehydration is the loss of too much water and other fluids from the body without taking in enough to replace what is lost.  Antibiotics are not effective in this illness, but there are a number of things you can do at home that will help.  Home care  Follow these home care measures:    If symptoms are severe, rest at home for the next 24 hours or until you are feeling better.    Wash your hands with soap and water or alcohol-based  to prevent the spread of infection. Wash your hands after touching anyone who is sick.    Wash your hands after using the toilet and before meals. Clean the toilet after each use.  Food preparation:    People with diarrhea should not prepare food for others. When preparing foods, wash your hands after touching anyone who is sick.    Wash your hands after using cutting boards, countertops, and knives that have been in contact with raw food.    Keep uncooked meats away from cooked and ready-to-eat foods.  Medicines:    You may use acetaminophen or NSAIDS such as ibuprofen or naproxen to control fever unless another medicine was prescribed.  If you have chronic liver or kidney disease or ever had a stomach ulcer or gastrointestinal bleeding, talk with your healthcare provider before using these medicines. Aspirin should never be used in anyone under 18 years of age who is ill with a fever. It may cause severe " liver damage. Don't use NSAID medicines if you are already taking one for another condition (like arthritis) or are on aspirin (such as for heart disease or after a stroke).    Anti-diarrhea medicine should be taken for this condition only if advised by your healthcare provider. Sometimes anti-diarrhea medicine can make your condition worse. If you have bloody diarrhea or fever, check with your healthcare provider before taking antidiarrheals.  Diet:    Water and clear liquids are important so you don't get dehydrated. Drink small amounts at a time, don't guzzle it down. If you are very dehydrated, sports drinks aren't a good choice. They have too much sugar and not enough electrolytes. In this case, commercially available products called oral rehydration solutions are best.    Caffeine, tobacco, and alcohol can make the diarrhea, cramping, and pain worse.    Don't force yourself to eat, especially if you have cramping, vomiting, or diarrhea. Don't eat large amounts at a time, even if you are hungry. It may make you feel worse.    If you eat, avoid fatty, greasy, spicy, or fried foods.    No dairy products, as they can make diarrhea worse.  During the first 24 Hours (the first full day) follow the diet below:    Beverages: Water, clear liquids, soft drinks without caffeine; ginger ale, mineral water (plain or flavored), decaffeinated tea and coffee.    Soups: Clear broth, consommé and bouillon    Desserts: Plain gelatin, popsicles and fruit juice bars  During the next 24 hours (the second day) you may add the following to the above if you have improved:    Hot cereal, plain toast, bread, rolls, crackers    Plain noodles, rice, mashed potatoes, chicken noodle or rice soup    Unsweetened canned fruit like applesauce and bananas (avoid pineapple and citrus)    Limit fat intake to less than 15 grams per day by avoiding margarine, butter, oils, mayonnaise, sauces, gravies, fried foods, peanut butter, meat, poultry and  fish.    Limit fiber; avoid raw or cooked vegetables, fresh fruits (except bananas) and bran cereals.    Limit caffeine and chocolate. No spices or seasonings except salt.  During the next 24 hours    Gradually resume a normal diet, as you feel better and your symptoms improve.    If at any time the diarrhea or cramping gets worse, go back to the simpler diet (above) or to clear liquids.  Follow-up care  Follow up with your healthcare provider, or as advised. Call if you are not improving within 24 hours or if the diarrhea lasts more than one week. This is especially true if you are in a high-risk group. For example, if you are very elderly, have a weak immune system (from cancer treatment for example), or you have inflammatory bowel disease (Crohn's or colitis).  If a stool (diarrhea) sample was taken, you may call in 2 days (or as directed) for the results.  When to seek medical advice  Call your healthcare provider right away if any of the following occur:    Increasing abdominal pain or constant lower right abdominal pain    Continued vomiting (unable to keep liquids down)    Frequent diarrhea (more than 5 times a day)    Blood in vomit or stool (black or red color)    Reduced oral intake    Dark urine, reduced urine output    Weakness, dizziness    Drowsiness    Fever of 100.4 F (38 C) oral or higher, or as directed by your healthcare provider    New rash  Call 911  Call 911 if any of the following occur:    Trouble breathing    Confused    Severe drowsiness or trouble awakening    Fainting or loss of consciousness    Rapid heart rate    Seizure    Stiff neck  Date Last Reviewed: 3/1/2018    8523-4711 [employer]. [address] [place] [address]. All rights reserved. This information is not intended as a substitute for professional medical care. Always follow your healthcare professional's instructions.          Viral Gastroenteritis (Adult)    Gastroenteritis is commonly called the  "\"stomach flu,\" although it has nothing to do with influenza. It is most often caused by a virus that affects the stomach and intestinal tract and usually lasts from 2 to 7 days. Common viruses causing gastroenteritis include norovirus, rotavirus, and hepatitis A. Non-viral causes of gastroenteritis include bacteria, parasites, and toxins.  The danger from repeated vomiting or diarrhea is dehydration. This is the loss of too much fluid from the body. When this occurs, body fluids must be replaced. Antibiotics don't help with this illness because it is usually viral. Simple home treatment will be helpful.  Symptoms of viral gastroenteritis may include:    Watery, loose stools    Stomach pain or abdominal cramps    Fever and chills    Nausea and vomiting    Loss of bowel control    Headache  Home care  Gastroenteritis is transmitted by contact with the stool or vomit of an infected person. This can occur from person to person or from contact with a contaminated surface.  Follow these guidelines when caring for yourself at home:    If symptoms are severe, rest at home for the next 24 hours or until you are feeling better.    Wash your hands with soap and water or use alcohol-based  to prevent the spread of infection. Wash your hands after touching anyone who is sick.    Wash your hands or use alcohol-based  after using the toilet and before meals. Clean the toilet after each use.  Remember these tips when preparing food:    People with diarrhea should not prepare or serve food to others. When preparing foods, wash your hands before and after.    Wash your hands after using cutting boards, countertops, knives, or utensils that have been in contact with raw food.    Dry your hands with a single use towel.    Keep uncooked meats away from cooked and ready-to-eat foods.  Medicine  You may use acetaminophen or NSAID medicines like ibuprofen or naproxen to control fever unless another medicine was given. If " you have chronic liver or kidney disease, talk with your healthcare provider before using these medicines. Also talk with your provider if you've had a stomach ulcer or gastrointestinal bleeding. Don't give aspirin to anyone under 18 years of age who is ill with a fever. It may cause severe liver damage. Don't use NSAIDS is you are already taking one for another condition (like arthritis) or are on aspirin (such as for heart disease or after a stroke).  If medicine for vomiting or diarrhea are prescribed, take these only as directed. Nausea and diarrhea medicines are generally OK unless you have bleeding, fever, or severe abdominal pain.  Diet  Follow these guidelines for food:    Water and liquids are important so you don't get dehydrated. Drink a small amount at a time or suck on ice chips if you are vomiting.    If you eat, avoid fatty, greasy, spicy, or fried foods.    Don't eat dairy if you have diarrhea. This can make diarrhea worse.    Avoid tobacco, alcohol, and caffeine which may worsen symptoms.  During the first 24 hours (the first full day), follow the diet below:    Beverages. Sports drinks, soft drinks without caffeine, ginger ale, mineral water (plain or flavored), decaffeinated tea and coffee. If you are very dehydrated, sports drinks aren't a good choice. They have too much sugar and not enough electrolytes. In this case, commercially available products called oral rehydration solutions, are best.    Soups. Eat clear broth, consommé, and bouillon.    Desserts. Eat gelatin, ice pops, and fruit juice bars.  During the next 24 hours (the second day), you may add the following to the above:    Hot cereal, plain toast, bread, rolls, and crackers    Plain noodles, rice, mashed potatoes, chicken noodle or rice soup    Unsweetened canned fruit (avoid pineapple), bananas    Limit fat intake to less than 15 grams per day. Do this by avoiding margarine, butter, oils, mayonnaise, sauces, gravies, fried foods,  peanut butter, meat, poultry, and fish.    Limit fiber and avoid raw or cooked vegetables, fresh fruits (except bananas), and bran cereals.    Limit caffeine and chocolate. Don't use spices or seasonings other than salt.    Limit dairy products.    Avoid alcohol.  During the next 24 hours:    Gradually resume a normal diet as you feel better and your symptoms improve.    If at any time it starts getting worse again, go back to clear liquids until you feel better.  Follow-up care  Follow up with your healthcare provider, or as advised. Call your provider if you don't get better within 24 hours or if diarrhea lasts more than a week. Also follow up if you are unable to keep down liquids and get dehydrated. If a stool (diarrhea) sample was taken, call as directed for the results.  Call 911  Call 911 if any of these occur:    Trouble breathing    Chest pain    Confused    Severe drowsiness or trouble awakening    Fainting or loss of consciousness    Rapid heart rate    Seizure    Stiff neck  When to seek medical advice  Call your healthcare provider right away if any of these occur:    Abdominal pain that gets worse    Continued vomiting (unable to keep liquids down)    Frequent diarrhea (more than 5 times a day)    Blood in vomit or stool (black or red color)    Dark urine, reduced urine output, or extreme thirst    Weakness or dizziness    Drowsiness    Fever of 100.4 F (38 C) or higher, or as directed by your healthcare provider    New rash  Date Last Reviewed: 6/1/2018 2000-2019 [employer]. [address] [place] [address], [place] [address]. All rights reserved. This information is not intended as a substitute for professional medical care. Always follow your healthcare professional's instructions.

## 2020-06-13 DIAGNOSIS — I10 HYPERTENSION, UNSPECIFIED TYPE: ICD-10-CM

## 2020-06-15 RX ORDER — HYDROCHLOROTHIAZIDE 12.5 MG/1
TABLET ORAL
Qty: 60 TABLET | Refills: 4 | Status: SHIPPED | OUTPATIENT
Start: 2020-06-15 | End: 2021-04-23

## 2020-06-15 NOTE — TELEPHONE ENCOUNTER
Prescription approved per [facility] Refill Protocol.  Johnny Mason, RN, BSN

## 2020-07-15 DIAGNOSIS — I10 ESSENTIAL HYPERTENSION: ICD-10-CM

## 2020-07-16 RX ORDER — AMLODIPINE BESYLATE 10 MG/1
TABLET ORAL
Qty: 90 TABLET | Refills: 1 | OUTPATIENT
Start: 2020-07-16

## 2020-07-16 NOTE — TELEPHONE ENCOUNTER
Sent 3/6/20 with 6 month supply. Refill not appropriate at this time.     Amanda Hernandez, RN, BSN

## 2020-10-21 ENCOUNTER — VIRTUAL VISIT (OUTPATIENT)
Dept: FAMILY MEDICINE | Facility: OTHER | Age: 35
End: 2020-10-21

## 2020-10-21 DIAGNOSIS — Z20.822 SUSPECTED 2019 NOVEL CORONAVIRUS INFECTION: Primary | ICD-10-CM

## 2020-10-21 NOTE — PROGRESS NOTES
"Date: 10/21/2020 07:38:36  Clinician: Eliza Rahman  Clinician NPI: [id number]  Patient: Drew Ennis  Patient : 1985  Patient Address: [address], [place] [address]  Patient Phone: [phone]  Visit Protocol: URI  Patient Summary:  Drew is a 35 year old ( : 1985 ) male who initiated a [facility] Visit for COVID-19 (Coronavirus) evaluation and screening. When asked the question \"Please sign me up to receive news, health information and promotions from [facility].\", Drew responded \"No\".    Drew states his symptoms started 1-2 days ago.   His symptoms consist of a headache, a cough, nasal congestion, nausea, myalgia, malaise, and diarrhea. Drew also feels feverish.   Symptom details     Nasal secretions: The color of his mucus is white, clear, and yellow.    Cough: Drew coughs a few times an hour and his cough is not more bothersome at night. Phlegm comes into his throat when he coughs. He does not believe his cough is caused by post-nasal drip. The color of the phlegm is clear, white, and yellow.     Temperature: His current temperature is 98.9 degrees Fahrenheit.     Headache: He states the headache is mild (1-3 on a 10 point pain scale).      Drew denies having ear pain, wheezing, enlarged lymph nodes, anosmia, vomiting, rhinitis, facial pain or pressure, chills, sore throat, teeth pain, and ageusia. He also denies having a sinus infection within the past year, taking antibiotic medication in the past month, and having recent facial or sinus surgery in the past 60 days. He is not experiencing dyspnea.   Precipitating events  He has not recently been exposed to someone with influenza. Drew has not been in close contact with any high risk individuals.   Pertinent COVID-19 (Coronavirus) information  In the past 14 days, Drew has not worked in a congregate living setting.   He does not work or volunteer as healthcare worker or a  and does not work or volunteer in a healthcare " facility.   Drew also has not lived in a congregate living setting in the past 14 days. He does not live with a healthcare worker.   Drew has had a close contact with a laboratory-confirmed COVID-19 patient within 14 days of symptom onset. Additional information about contact with COVID-19 (Coronavirus) patient as reported by the patient (free text): Edd Pina 10/17/20   Since December 2019, Drew and has not had upper respiratory infection or influenza-like illness. Has not been diagnosed with lab-confirmed COVID-19 test   Pertinent medical history  Drew needs a return to work/school note.   Weight: 300 lbs   Drew does not smoke or use smokeless tobacco.   Weight: 300 lbs    MEDICATIONS: hydrochlorothiazide oral, amlodipine oral, ALLERGIES: prochlorperazine  Clinician Response:  Dear Drew,   Your symptoms show that you may have coronavirus (COVID-19). This illness can cause fever, cough and trouble breathing. Many people get a mild case and get better on their own. Some people can get very sick.  What should I do?  We would like to test you for this virus.   1. Please call [phone] to schedule your visit. Explain that you were referred by [facility] to have a COVID-19 test. Be ready to [facility].  The following will serve as your written order for this COVID Test, ordered by me, for the indication of suspected COVID [Z20.828]: The test will be ordered in [facility], our electronic health record, after you are scheduled. It will show as ordered and authorized by Khoi Resendez MD.  Order: COVID-19 (Coronavirus) PCR for SYMPTOMATIC testing from [facility].      2. When it's time for your COVID test:  Stay at least 6 feet away from others. (If someone will drive you to your test, stay in the backseat, as far away from the  as you can.)   Cover your mouth and nose with a mask, tissue or washcloth.  Go straight to the testing site. Don't make any stops on the way there or back.       "3.Starting now: Stay home and away from others (self-isolate) until:   You've had no fever---and no medicine that reduces fever---for one full day (24 hours). And...   Your other symptoms have gotten better. For example, your cough or breathing has improved. And...   At least 10 days have passed since your symptoms started.       During this time, don't leave the house except for testing or medical care.   Stay in your own room, even for meals. Use your own bathroom if you can.   Stay away from others in your home. No hugging, kissing or shaking hands. No visitors.  Don't go to work, school or anywhere else.    Clean \"high touch\" surfaces often (doorknobs, counters, handles, etc.). Use a household cleaning spray or wipes. You'll find a full list of  on the EPA website: www.epa.gov/pesticide-registration/list-n-disinfectants-use-against-sars-cov-2.   Cover your mouth and nose with a mask, tissue or washcloth to avoid spreading germs.  Wash your hands and face often. Use soap and water.  Caregivers in these groups are at risk for severe illness due to COVID-19:  o People 65 years and older  o People who live in a nursing home or long-term care facility  o People with chronic disease (lung, heart, cancer, diabetes, kidney, liver, immunologic)  o People who have a weakened immune system, including those who:   Are in cancer treatment  Take medicine that weakens the immune system, such as corticosteroids  Had a bone marrow or organ transplant  Have an immune deficiency  Have poorly controlled HIV or AIDS  Are obese (body mass index of 40 or higher)  Smoke regularly   o Caregivers should wear gloves while washing dishes, handling laundry and cleaning bedrooms and bathrooms.  o Use caution when washing and drying laundry: Don't shake dirty laundry, and use the warmest water setting that you can.  o For more tips, go to www.cdc.gov/coronavirus/2019-ncov/downloads/10Things.pdf.    4.Sign up for Starr Russell. We know " it's scary to hear that you might have COVID-19. We want to track your symptoms to make sure you're okay over the next 2 weeks. Please look for an email from [email or web address] Yvonne---this is a free, online program that we'll use to keep in touch. To sign up, follow the link in the email. Learn more at [email or web address]  How can I take care of myself?   Get lots of rest. Drink extra fluids (unless a doctor has told you not to).   Take Tylenol (acetaminophen) for fever or pain. If you have liver or kidney problems, ask your family doctor if it's okay to take Tylenol.   Adults can take either:    650 mg (two 325 mg pills) every 4 to 6 hours, or...   1,000 mg (two 500 mg pills) every 8 hours as needed.    Note: Don't take more than 3,000 mg in one day. Acetaminophen is found in many medicines (both prescribed and over-the-counter medicines). Read all labels to be sure you don't take too much.   For children, check the Tylenol bottle for the right dose. The dose is based on the child's age or weight.    If you have other health problems (like cancer, heart failure, an organ transplant or severe kidney disease): Call your specialty clinic if you don't feel better in the next 2 days.       Know when to call 911. Emergency warning signs include:    Trouble breathing or shortness of breath Pain or pressure in the chest that doesn't go away Feeling confused like you haven't felt before, or not being able to wake up Bluish-colored lips or face.  Where can I get more information?    [facility] -- About COVID-19: [email or web address]   CDC -- What to Do If You're Sick: www.cdc.gov/coronavirus/2019-ncov/about/steps-when-sick.html   CDC -- Ending Home Isolation: www.cdc.gov/coronavirus/2019-ncov/hcp/disposition-in-home-patients.html   CDC -- Caring for Someone: www.cdc.gov/coronavirus/2019-ncov/if-you-are-sick/care-for-someone.html   [facility] -- Interim Guidance for Hospital Discharge to Home:  www.health.[facility].[place]/diseases/coronavirus/hcp/hospdischarge.pdf   [facility] clinical trials (COVID-19 research studies): clinicalaffairs.[facility]/umn-clinical-trials    Below are the COVID-19 hotlines at the [facility] ([facility]). Interpreters are available.    For health questions: Call 320-675-7778 or 1-536.514.3454 (7 a.m. to 7 p.m.) For questions about schools and childcare: Call 454-518-2143 or 1-484.557.5244 (7 a.m. to 7 p.m.)    Diagnosis: Cough  Diagnosis ICD: R05

## 2020-11-09 ENCOUNTER — VIRTUAL VISIT (OUTPATIENT)
Dept: FAMILY MEDICINE | Facility: OTHER | Age: 35
End: 2020-11-09
Payer: COMMERCIAL

## 2020-11-09 PROCEDURE — 99421 OL DIG E/M SVC 5-10 MIN: CPT | Performed by: FAMILY MEDICINE

## 2020-11-09 NOTE — PROGRESS NOTES
"Date: 2020 09:01:20  Clinician: Yosef Forrester  Clinician NPI: [id number]  Patient: Drew Ennis  Patient : 1985  Patient Address: [address], [place] [address]  Patient Phone: [phone]  Visit Protocol: URI  Patient Summary:  Drew is a 35 year old ( : 1985 ) male who initiated a [facility] Visit for COVID-19 (Coronavirus) evaluation and screening. When asked the question \"Please sign me up to receive news, health information and promotions from [facility].\", Drew responded \"No\".    Drew states his symptoms started 1-2 days ago.   His symptoms consist of myalgia, malaise, diarrhea, a headache, nasal congestion, and nausea. He is experiencing difficulty breathing due to nasal congestion but he is not short of breath. Drew also feels feverish.   Symptom details     Nasal secretions: The color of his mucus is yellow, white, and clear.    Temperature: His current temperature is 100.3 degrees Fahrenheit. Drew has had a temperature over 100 degrees Fahrenheit for 1-2 days.     Headache: He states the headache is mild (1-3 on a 10 point pain scale).      Drew denies having vomiting, rhinitis, facial pain or pressure, chills, sore throat, teeth pain, ageusia, ear pain, wheezing, cough, and anosmia. He also denies taking antibiotic medication in the past month, having recent facial or sinus surgery in the past 60 days, and having a sinus infection within the past year.   Precipitating events  He has not recently been exposed to someone with influenza. Drew has not been in close contact with any high risk individuals.   Pertinent COVID-19 (Coronavirus) information  Drew does not work or volunteer as healthcare worker or a . In the past 14 days, Drew has not worked or volunteered at a healthcare facility or group living setting.   In the past 14 days, he also has not lived in a congregate living setting.   Drew has not had a close contact with a laboratory-confirmed COVID-19 " patient within 14 days of symptom onset.    Since December 2019, Drew has been tested for COVID-19 and has not had upper respiratory infection or influenza-like illness.      Result of COVID-19 test: Negative     Date of his COVID-19 test: 10/21/2020      Pertinent medical history  Drew needs a return to work/school note.   Weight: 300 lbs   Drew does not smoke or use smokeless tobacco.   Weight: 300 lbs    MEDICATIONS: hydrochlorothiazide oral, amlodipine oral, ALLERGIES: prochlorperazine  Clinician Response:  Dear Drew,   Your symptoms show that you may have coronavirus (COVID-19). This illness can cause fever, cough and trouble breathing. Many people get a mild case and get better on their own. Some people can get very sick.  What should I do?  We would like to test you for this virus.   1. Please call [phone] to schedule your visit. Explain that you were referred by [facility] to have a COVID-19 test. Be ready to share your [facility] visit ID number.  * If you need to schedule in [facility] please call [phone] or for [employer] employees please call [phone].  * If you need to schedule in the [place] area please call [phone]. [place] employees call [phone].  The following will serve as your written order for this COVID Test, ordered by me, for the indication of suspected COVID [Z20.828]: The test will be ordered in [employer], our electronic health record, after you are scheduled. It will show as ordered and authorized by Khoi Resendez MD.  Order: COVID-19 (Coronavirus) PCR for SYMPTOMATIC testing from [facility].   2. When it's time for your COVID test:  Stay at least 6 feet away from others. (If someone will drive you to your test, stay in the backseat, as far away from the  as you can.)   Cover your mouth and nose with a mask, tissue or washcloth.  Go straight to the testing site. Don't make any stops on the way there or back.      3.Starting now: Stay home and away from others (self-isolate)  "until:   You've had no fever---and no medicine that reduces fever---for one full day (24 hours). And...   Your other symptoms have gotten better. For example, your cough or breathing has improved. And...   At least 10 days have passed since your symptoms started.       During this time, don't leave the house except for testing or medical care.   Stay in your own room, even for meals. Use your own bathroom if you can.   Stay away from others in your home. No hugging, kissing or shaking hands. No visitors.  Don't go to work, school or anywhere else.    Clean \"high touch\" surfaces often (doorknobs, counters, handles, etc.). Use a household cleaning spray or wipes. You'll find a full list of  on the EPA website: www.epa.gov/pesticide-registration/list-n-disinfectants-use-against-sars-cov-2.   Cover your mouth and nose with a mask, tissue or washcloth to avoid spreading germs.  Wash your hands and face often. Use soap and water.  Caregivers in these groups are at risk for severe illness due to COVID-19:  o People 65 years and older  o People who live in a nursing home or long-term care facility  o People with chronic disease (lung, heart, cancer, diabetes, kidney, liver, immunologic)  o People who have a weakened immune system, including those who:   Are in cancer treatment  Take medicine that weakens the immune system, such as corticosteroids  Had a bone marrow or organ transplant  Have an immune deficiency  Have poorly controlled HIV or AIDS  Are obese (body mass index of 40 or higher)  Smoke regularly   o Caregivers should wear gloves while washing dishes, handling laundry and cleaning bedrooms and bathrooms.  o Use caution when washing and drying laundry: Don't shake dirty laundry, and use the warmest water setting that you can.  o For more tips, go to www.cdc.gov/coronavirus/2019-ncov/downloads/10Things.pdf.    4.Sign up for [facility]. We know it's scary to hear that you might have COVID-19. We want to " track your symptoms to make sure you're okay over the next 2 weeks. Please look for an email from [employer]---this is a free, online program that we'll use to keep in touch. To sign up, follow the link in the email. Learn more at [email or web address]  How can I take care of myself?   Get lots of rest. Drink extra fluids (unless a doctor has told you not to).   Take Tylenol (acetaminophen) for fever or pain. If you have liver or kidney problems, ask your family doctor if it's okay to take Tylenol.   Adults can take either:    650 mg (two 325 mg pills) every 4 to 6 hours, or...   1,000 mg (two 500 mg pills) every 8 hours as needed.    Note: Don't take more than 3,000 mg in one day. Acetaminophen is found in many medicines (both prescribed and over-the-counter medicines). Read all labels to be sure you don't take too much.   For children, check the Tylenol bottle for the right dose. The dose is based on the child's age or weight.    If you have other health problems (like cancer, heart failure, an organ transplant or severe kidney disease): Call your specialty clinic if you don't feel better in the next 2 days.       Know when to call 911. Emergency warning signs include:    Trouble breathing or shortness of breath Pain or pressure in the chest that doesn't go away Feeling confused like you haven't felt before, or not being able to wake up Bluish-colored lips or face.  Where can I get more information?   [facility] -- About COVID-19: [email or web address]   CDC -- What to Do If You're Sick: www.cdc.gov/coronavirus/2019-ncov/about/steps-when-sick.html   CDC -- Ending Home Isolation: www.cdc.gov/coronavirus/2019-ncov/hcp/disposition-in-home-patients.html   CDC -- Caring for Someone: www.cdc.gov/coronavirus/2019-ncov/if-you-are-sick/care-for-someone.html   [facility] -- Interim Guidance for Hospital Discharge to Home: www.health.[facility].[place]./diseases/coronavirus/hcp/hospdischarge.pdf   [place]  [facility] clinical trials (COVID-19 research studies): clinicalaffairs.[facility]/n-clinical-trials    Below are the COVID-19 hotlines at the [facility] of Health ([facility]). Interpreters are available.    For health questions: Call 726-576-6805 or 1-863.274.9960 (7 a.m. to 7 p.m.) For questions about schools and childcare: Call [phone] or 1-363.497.9614 (7 a.m. to 7 p.m.)    Diagnosis: Contact with and (suspected) exposure to other viral communicable diseases  Diagnosis ICD: Z20.828

## 2020-11-10 DIAGNOSIS — Z20.822 SUSPECTED 2019 NOVEL CORONAVIRUS INFECTION: ICD-10-CM

## 2020-11-10 LAB
SARS-COV-2 RNA SPEC QL NAA+PROBE: NOT DETECTED
SPECIMEN SOURCE: NORMAL

## 2020-11-10 PROCEDURE — U0003 INFECTIOUS AGENT DETECTION BY NUCLEIC ACID (DNA OR RNA); SEVERE ACUTE RESPIRATORY SYNDROME CORONAVIRUS 2 (SARS-COV-2) (CORONAVIRUS DISEASE [COVID-19]), AMPLIFIED PROBE TECHNIQUE, MAKING USE OF HIGH THROUGHPUT TECHNOLOGIES AS DESCRIBED BY CMS-2020-01-R: HCPCS | Performed by: FAMILY MEDICINE

## 2021-01-04 ENCOUNTER — HEALTH MAINTENANCE LETTER (OUTPATIENT)
Age: 36
End: 2021-01-04

## 2021-02-16 ENCOUNTER — OFFICE VISIT (OUTPATIENT)
Dept: URGENT CARE | Facility: URGENT CARE | Age: 36
End: 2021-02-16
Attending: FAMILY MEDICINE
Payer: COMMERCIAL

## 2021-02-16 ENCOUNTER — E-VISIT (OUTPATIENT)
Dept: URGENT CARE | Facility: URGENT CARE | Age: 36
End: 2021-02-16
Payer: COMMERCIAL

## 2021-02-16 DIAGNOSIS — Z20.822 SUSPECTED COVID-19 VIRUS INFECTION: ICD-10-CM

## 2021-02-16 DIAGNOSIS — Z20.822 SUSPECTED COVID-19 VIRUS INFECTION: Primary | ICD-10-CM

## 2021-02-16 LAB
SARS-COV-2 RNA RESP QL NAA+PROBE: NORMAL
SPECIMEN SOURCE: NORMAL

## 2021-02-16 PROCEDURE — 87635 SARS-COV-2 COVID-19 AMP PRB: CPT | Performed by: FAMILY MEDICINE

## 2021-02-16 PROCEDURE — 99421 OL DIG E/M SVC 5-10 MIN: CPT | Performed by: FAMILY MEDICINE

## 2021-02-16 NOTE — PATIENT INSTRUCTIONS
Dear Drew Ennis,    Your symptoms show that you may have coronavirus (COVID-19). This illness can cause fever, cough and trouble breathing. Many people get a mild case and get better on their own. Some people can get very sick.    Will I be tested for COVID-19?  We would like to test you for Covid-19 virus. I have placed orders for this test.     To schedule: go to your [employer] home page and scroll down to the section that says  You have an appointment that needs to be scheduled  and click the large green button that says  Schedule Now  and follow the steps to find the next available openings.    If you are unable to complete these [employer] scheduling steps, please call [phone] to schedule your testing.     Return to work/school/ guidance:  Please let your workplace manager and staffing office know when your quarantine ends     We can t give you an exact date as it depends on the above. You can calculate this on your own or work with your manager/staffing office to calculate this. (For example if you were exposed on 10/4, you would have to quarantine for 14 full days. That would be through 10/18. You could return on 10/19.)      If you receive a positive COVID-19 test result, follow the guidance of the those who are giving you the results. Usually the return to work is 10 (or in some cases 20 days from symptom onset.) If you work at [facility], you must also be cleared by Employee Occupational Health and Safety to return to work.        If you receive a negative COVID-19 test result and did not have a high risk exposure to someone with a known positive COVID-19 test, you can return to work once you're free of fever for 24 hours without fever-reducing medication and your symptoms are improving or resolved.      If you receive a negative COVID-19 test and If you had a high risk exposure to someone who has tested positive for COVID-19 then you can return to work 14 days after your last contact  with the positive individual    Note: If you have ongoing exposure to the covid positive person, this quarantine period may be more than 14 days. (For example, if you are continued to be exposed to your child who tested positive and cannot isolate from them, then the quarantine of 7-14 days can't start until your child is no longer contagious. This is typically 10 days from onset of the child's symptoms. So the total duration may be 17-24 days in this case.)    Sign up for [employer].   We know it's scary to hear that you might have COVID-19. We want to track your symptoms to make sure you're okay over the next 2 weeks. Please look for an email from [employer]--this is a free, online program that we'll use to keep in touch. To sign up, follow the link in the email you will receive. Learn more at [email or web address]    How can I take care of myself?    Get lots of rest. Drink extra fluids (unless a doctor has told you not to)    Take Tylenol (acetaminophen) or ibuprofen for fever or pain. If you have liver or kidney problems, ask your family doctor if it's okay to take Tylenol o ibuprofen    If you have other health problems (like cancer, heart failure, an organ transplant or severe kidney disease): Call your specialty clinic if you don't feel better in the next 2 days.    Know when to call 911. Emergency warning signs include:  o Trouble breathing or shortness of breath  o Pain or pressure in the chest that doesn't go away  o Feeling confused like you haven't felt before, or not being able to wake up  o Bluish-colored lips or face    Where can I get more information?  [facility] - About COVID-19:   [email or web address]    CDC - What to Do If You're Sick:   www.cdc.gov/coronavirus/2019-ncov/about/steps-when-sick.html    February 16, 2021  RE:  Drew LESTER  [address]  [place] [address]      To whom it may concern:    I evaluated Drew Ennis on February 16, 2021. Drew Ennis should be excused from work/school.     They should let their workplace manager and staffing office know when their quarantine ends.    We can not give an exact date as it depends on the information below. They can calculate this on their own or work with their manager/staffing office to calculate this. (For example if they were exposed on 10/04, they would have to quarantine for 14 full days. That would be through 10/18. They could return on 10/19.)    Quarantine Guidelines:      If patient receives a positive COVID-19 test result, they should follow the guidance of those who are giving the results. Usually the return to work is 10 (or in some cases 20 days from symptom onset.) If they work at [employer], they must be cleared by Employee Occupational Health and Safety to return to work.        If patient receives a negative COVID-19 test result and did not have a high risk exposure to someone with a known positive COVID-19 test, they can return to work once they're free of fever for 24 hours without fever-reducing medication and their symptoms are improving or resolved.      If patient receives a negative COVID-19 test and if they had a high risk exposure to someone who has tested positive for COVID-19 then they can return to work 14 days after their last contact with the positive individual    Note: If there is ongoing exposure to the covid positive person, this quarantine period may be longer than 14 days. (For example, if they are continually exposed to their child, who tested positive and cannot isolate from them, then the quarantine of 7-14 days can't start until their child is no longer contagious. This is typically 10 days from onset to the child's symptoms. So the total duration may be 17-24 days in this case.)     Sincerely,  Yosef Forrester, DO

## 2021-02-17 LAB
LABORATORY COMMENT REPORT: NORMAL
SARS-COV-2 RNA RESP QL NAA+PROBE: NEGATIVE
SPECIMEN SOURCE: NORMAL

## 2021-04-17 DIAGNOSIS — I10 HYPERTENSION, UNSPECIFIED TYPE: ICD-10-CM

## 2021-04-19 RX ORDER — HYDROCHLOROTHIAZIDE 12.5 MG/1
TABLET ORAL
Qty: 90 TABLET | Refills: 3 | OUTPATIENT
Start: 2021-04-19

## 2021-04-19 NOTE — TELEPHONE ENCOUNTER
Pending Prescriptions:                       Disp   Refills    hydrochlorothiazide (HYDRODIURIL) 12.5 MG *90 tab*3        Sig: TAKE 1 TABLET BY MOUTH EVERY DAY    Routing refill request to provider for review/approval because:  A break in medication  Patient needs to be seen because:  Due for visit with labs and BP

## 2021-04-20 NOTE — TELEPHONE ENCOUNTER
Spoke with patient appointment scheduled   Next 5 appointments (look out 90 days)    Apr 23, 2021  1:00 PM  PHYSICAL with Francisco Bello MD  [facility] ([facility] ) [address], [address]  [place] [address]  [phone]        Closing encounter  Heather Chaudhry RT (R)

## 2021-04-21 NOTE — PROGRESS NOTES
SUBJECTIVE:   CC: Drew Ennis is an 35 year old male who presents for preventative health visit.       Patient has been advised of split billing requirements and indicates understanding: Yes     Healthy Habits:     Getting at least 3 servings of Calcium per day:  NO    Bi-annual eye exam:  Yes    Dental care twice a year:  Yes    Sleep apnea or symptoms of sleep apnea:  None    Diet:  Regular (no restrictions)    Frequency of exercise:  None    Taking medications regularly:  Yes    Medication side effects:  None    PHQ-2 Total Score: 1    Additional concerns today:  Yes        Today's PHQ-2 Score:   PHQ-2 ( 1999 Pfizer) 3/6/2020   Q1: Little interest or pleasure in doing things 2   Q2: Feeling down, depressed or hopeless 0   PHQ-2 Score 2   Q1: Little interest or pleasure in doing things More than half the days   Q2: Feeling down, depressed or hopeless Not at all   PHQ-2 Score 2       Abuse: Current or Past(Physical, Sexual or Emotional)- No  Do you feel safe in your environment? Yes    Have you ever done Advance Care Planning? (For example, a Health Directive, POLST, or a discussion with a medical provider or your loved ones about your wishes): No, advance care planning information given to patient to review.  Patient plans to discuss their wishes with loved ones or provider.      Social History     Tobacco Use     Smoking status: Never Smoker     Smokeless tobacco: Never Used     Tobacco comment: none    Substance Use Topics     Alcohol use: Not Currently     Comment: none          Alcohol Use 12/2/2019   Prescreen: >3 drinks/day or >7 drinks/week? Not Applicable   Prescreen: >3 drinks/day or >7 drinks/week? -       Last PSA: No results found for: PSA    Reviewed orders with patient. Reviewed health maintenance and updated orders accordingly - Yes  Lab work is in process  Labs reviewed in EPIC  BP Readings from Last 3 Encounters:   04/23/21 (!) 162/100   03/06/20 132/74   03/04/20 116/68    Wt Readings from  "Last 3 Encounters:   04/23/21 140.6 kg (310 lb)   03/06/20 136.1 kg (300 lb)   03/04/20 133.7 kg (294 lb 11.2 oz)                    Reviewed and updated as needed this visit by clinical staff                 Reviewed and updated as needed this visit by Provider                Past Medical History:   Diagnosis Date     Hypertension       Past Surgical History:   Procedure Laterality Date     APPENDECTOMY       DISCECTOMY LUMBAR POSTERIOR MICROSCOPIC ONE LEVEL Right 3/4/2020    Procedure: RIGHT L4-L5 HEMILAMINECTOMY AND MICRODISCECTOMY;  Surgeon: Agus Can MD;  Location: [facility] OR     UPPER GI ENDOSCOPY         Review of Systems   Constitutional: Negative for chills and fever.   HENT: Negative for congestion, ear pain, hearing loss and sore throat.    Eyes: Negative for pain and visual disturbance.   Respiratory: Negative for cough and shortness of breath.    Cardiovascular: Negative for chest pain, palpitations and peripheral edema.   Gastrointestinal: Negative for abdominal pain, constipation, diarrhea, heartburn, hematochezia and nausea.   Genitourinary: Negative for discharge, dysuria, frequency, genital sores, hematuria, impotence and urgency.   Musculoskeletal: Negative for arthralgias, joint swelling and myalgias.   Skin: Negative for rash.   Neurological: Positive for headaches. Negative for dizziness, weakness and paresthesias.   Psychiatric/Behavioral: Negative for mood changes. The patient is not nervous/anxious.          OBJECTIVE:   BP (!) 162/100   Pulse 110   Temp 97.7  F (36.5  C) (Temporal)   Resp 18   Ht 1.803 m (5' 11\")   Wt 140.6 kg (310 lb)   SpO2 97%   BMI 43.24 kg/m      Physical Exam  GENERAL: alert, no distress and obese  EYES: Eyes grossly normal to inspection, PERRL and conjunctivae and sclerae normal  HENT: ear canals and TM's normal, nose and mouth without ulcers or lesions  NECK: no adenopathy, no asymmetry, masses, or scars and thyroid normal to palpation  RESP: lungs " clear to auscultation - no rales, rhonchi or wheezes  CV: regular rate and rhythm, normal S1 S2, no S3 or S4, no murmur, click or rub, no peripheral edema and peripheral pulses strong  ABDOMEN: soft, nontender, no hepatosplenomegaly, no masses and bowel sounds normal  MS: no gross musculoskeletal defects noted, no edema  SKIN: no suspicious lesions or rashes  NEURO: Normal strength and tone, mentation intact and speech normal  PSYCH: mentation appears normal, affect normal/bright    Diagnostic Test Results:  Labs reviewed in Epic    ASSESSMENT/PLAN:   1. Routine general medical examination at a health care facility  35-year-old male here for annual well check.  We discussed current cancer screening guidelines.  See below for other issues addressed.    2. Tension headache  Ongoing recurrent tension headaches, he was previously prescribed cyclobenzaprine, which he takes occasionally for tension headaches.  They are working well for him.  Medications refilled.  Follow-up if no continued improvement or any worsening.  - cyclobenzaprine (FLEXERIL) 10 MG tablet; Take 1 tablet (10 mg) by mouth 3 times daily as needed for muscle spasms  Dispense: 60 tablet; Refill: 1    3. Essential hypertension  Not controlled, he has not been taking his medication for the past few months.  We discussed the importance of taking medication every day.  He understands.  Return in 2 weeks for blood pressure recheck.  - amLODIPine (NORVASC) 10 MG tablet; Take 1 tablet (10 mg) by mouth daily  Dispense: 90 tablet; Refill: 1  - hydrochlorothiazide (HYDRODIURIL) 12.5 MG tablet; Take 1 tablet (12.5 mg) by mouth daily  Dispense: 90 tablet; Refill: 1  - Hemoglobin A1c  - Basic metabolic panel  (Ca, Cl, CO2, Creat, Gluc, K, Na, BUN)  - Lipid panel reflex to direct LDL Fasting    5. Morbid obesity (H)  BMI currently 43.24, he is trying to improve his exercise.,      Patient has been advised of split billing requirements and indicates understanding:  Yes  COUNSELING:   Reviewed preventive health counseling, as reflected in patient instructions       Regular exercise       Healthy diet/nutrition       Colon cancer screening       Prostate cancer screening    Estimated body mass index is 40.69 kg/m  as calculated from the following:    Height as of 3/6/20: 1.829 m (6').    Weight as of 3/6/20: 136.1 kg (300 lb).     Weight management plan: Discussed healthy diet and exercise guidelines    He reports that he has never smoked. He has never used smokeless tobacco.      Counseling Resources:  ATP IV Guidelines  Pooled Cohorts Equation Calculator  FRAX Risk Assessment  ICSI Preventive Guidelines  Dietary Guidelines for Americans, 2010  USDA's MyPlate  ASA Prophylaxis  Lung CA Screening    Francisco Bello MD  [facility]

## 2021-04-23 ENCOUNTER — OFFICE VISIT (OUTPATIENT)
Dept: FAMILY MEDICINE | Facility: CLINIC | Age: 36
End: 2021-04-23
Payer: COMMERCIAL

## 2021-04-23 VITALS
BODY MASS INDEX: 43.4 KG/M2 | RESPIRATION RATE: 18 BRPM | DIASTOLIC BLOOD PRESSURE: 100 MMHG | OXYGEN SATURATION: 97 % | HEART RATE: 110 BPM | WEIGHT: 310 LBS | TEMPERATURE: 97.7 F | HEIGHT: 71 IN | SYSTOLIC BLOOD PRESSURE: 162 MMHG

## 2021-04-23 DIAGNOSIS — E66.01 MORBID OBESITY (H): ICD-10-CM

## 2021-04-23 DIAGNOSIS — Z00.00 ROUTINE GENERAL MEDICAL EXAMINATION AT A HEALTH CARE FACILITY: Primary | ICD-10-CM

## 2021-04-23 DIAGNOSIS — G44.209 TENSION HEADACHE: ICD-10-CM

## 2021-04-23 DIAGNOSIS — I10 ESSENTIAL HYPERTENSION: ICD-10-CM

## 2021-04-23 LAB
ANION GAP SERPL CALCULATED.3IONS-SCNC: 4 MMOL/L (ref 3–14)
BUN SERPL-MCNC: 11 MG/DL (ref 7–30)
CALCIUM SERPL-MCNC: 9.1 MG/DL (ref 8.5–10.1)
CHLORIDE SERPL-SCNC: 101 MMOL/L (ref 94–109)
CHOLEST SERPL-MCNC: 192 MG/DL
CO2 SERPL-SCNC: 31 MMOL/L (ref 20–32)
CREAT SERPL-MCNC: 0.93 MG/DL (ref 0.66–1.25)
GFR SERPL CREATININE-BSD FRML MDRD: >90 ML/MIN/{1.73_M2}
GLUCOSE SERPL-MCNC: 199 MG/DL (ref 70–99)
HBA1C MFR BLD: 6.4 % (ref 0–5.6)
HDLC SERPL-MCNC: 35 MG/DL
LDLC SERPL CALC-MCNC: 96 MG/DL
NONHDLC SERPL-MCNC: 157 MG/DL
POTASSIUM SERPL-SCNC: 3.5 MMOL/L (ref 3.4–5.3)
SODIUM SERPL-SCNC: 136 MMOL/L (ref 133–144)
TRIGL SERPL-MCNC: 306 MG/DL

## 2021-04-23 PROCEDURE — 83036 HEMOGLOBIN GLYCOSYLATED A1C: CPT | Performed by: FAMILY MEDICINE

## 2021-04-23 PROCEDURE — 80061 LIPID PANEL: CPT | Performed by: FAMILY MEDICINE

## 2021-04-23 PROCEDURE — 99395 PREV VISIT EST AGE 18-39: CPT | Performed by: FAMILY MEDICINE

## 2021-04-23 PROCEDURE — 80048 BASIC METABOLIC PNL TOTAL CA: CPT | Performed by: FAMILY MEDICINE

## 2021-04-23 PROCEDURE — 99214 OFFICE O/P EST MOD 30 MIN: CPT | Mod: 25 | Performed by: FAMILY MEDICINE

## 2021-04-23 PROCEDURE — 36415 COLL VENOUS BLD VENIPUNCTURE: CPT | Performed by: FAMILY MEDICINE

## 2021-04-23 RX ORDER — HYDROCHLOROTHIAZIDE 12.5 MG/1
12.5 TABLET ORAL DAILY
Qty: 90 TABLET | Refills: 1 | Status: SHIPPED | OUTPATIENT
Start: 2021-04-23 | End: 2021-10-29

## 2021-04-23 RX ORDER — CYCLOBENZAPRINE HCL 10 MG
10 TABLET ORAL 3 TIMES DAILY PRN
Qty: 60 TABLET | Refills: 1 | Status: SHIPPED | OUTPATIENT
Start: 2021-04-23 | End: 2021-12-23

## 2021-04-23 RX ORDER — AMLODIPINE BESYLATE 10 MG/1
10 TABLET ORAL DAILY
Qty: 90 TABLET | Refills: 1 | Status: SHIPPED | OUTPATIENT
Start: 2021-04-23 | End: 2021-10-29

## 2021-04-23 ASSESSMENT — ENCOUNTER SYMPTOMS
COUGH: 0
NERVOUS/ANXIOUS: 0
EYE PAIN: 0
WEAKNESS: 0
SHORTNESS OF BREATH: 0
SORE THROAT: 0
DIZZINESS: 0
HEMATOCHEZIA: 0
JOINT SWELLING: 0
CONSTIPATION: 0
FEVER: 0
HEADACHES: 1
HEARTBURN: 0
PALPITATIONS: 0
MYALGIAS: 0
NAUSEA: 0
ABDOMINAL PAIN: 0
ARTHRALGIAS: 0
PARESTHESIAS: 0
HEMATURIA: 0
CHILLS: 0
DIARRHEA: 0
FREQUENCY: 0
DYSURIA: 0

## 2021-04-23 ASSESSMENT — PAIN SCALES - GENERAL: PAINLEVEL: MILD PAIN (2)

## 2021-04-23 ASSESSMENT — MIFFLIN-ST. JEOR: SCORE: 2373.28

## 2021-04-26 NOTE — RESULT ENCOUNTER NOTE
Nate,  Your recent studies showed elevated cholesterol and blood sugar.  Please schedule a follow appointment so we can discuss treatment strategies.  Please let me know if you have any questions or concerns and follow up as discussed in clinic.    Sincerely.  Dr. SWATHI Bello MD, [facility]  Family Medicine Physician  [facility] [place]  [address] [place] [address]

## 2021-05-10 ENCOUNTER — ALLIED HEALTH/NURSE VISIT (OUTPATIENT)
Dept: FAMILY MEDICINE | Facility: CLINIC | Age: 36
End: 2021-05-10
Payer: COMMERCIAL

## 2021-05-10 VITALS — SYSTOLIC BLOOD PRESSURE: 150 MMHG | DIASTOLIC BLOOD PRESSURE: 90 MMHG

## 2021-05-10 DIAGNOSIS — I10 ESSENTIAL HYPERTENSION: Primary | ICD-10-CM

## 2021-05-10 PROCEDURE — 99207 PR NO CHARGE NURSE ONLY: CPT

## 2021-05-10 NOTE — PROGRESS NOTES
Drew Ennis is a 35 year old patient who comes in today for a Blood Pressure check.  Initial BP:  BP (!) 150/90 (BP Location: Left arm, Patient Position: Chair, Cuff Size: Adult Large)      Data Unavailable  Disposition: results routed to provider      Patient would prefer any recommendations for patient sent through [employer].

## 2021-10-05 ENCOUNTER — E-VISIT (OUTPATIENT)
Dept: FAMILY MEDICINE | Facility: CLINIC | Age: 36
End: 2021-10-05
Payer: COMMERCIAL

## 2021-10-05 DIAGNOSIS — J06.9 UPPER RESPIRATORY TRACT INFECTION, UNSPECIFIED TYPE: Primary | ICD-10-CM

## 2021-10-05 PROCEDURE — 99207 PR NON-BILLABLE SERV PER CHARTING: CPT | Performed by: FAMILY MEDICINE

## 2021-10-10 ENCOUNTER — HEALTH MAINTENANCE LETTER (OUTPATIENT)
Age: 36
End: 2021-10-10

## 2021-10-28 DIAGNOSIS — I10 ESSENTIAL HYPERTENSION: ICD-10-CM

## 2021-10-29 RX ORDER — AMLODIPINE BESYLATE 10 MG/1
TABLET ORAL
Qty: 90 TABLET | Refills: 1 | Status: SHIPPED | OUTPATIENT
Start: 2021-10-29 | End: 2023-01-03

## 2021-10-29 RX ORDER — HYDROCHLOROTHIAZIDE 12.5 MG/1
12.5 TABLET ORAL DAILY
Qty: 90 TABLET | Refills: 1 | Status: SHIPPED | OUTPATIENT
Start: 2021-10-29 | End: 2023-01-03

## 2021-10-29 NOTE — TELEPHONE ENCOUNTER
Pending Prescriptions:                       Disp   Refills    hydrochlorothiazide (HYDRODIURIL) 12.5 MG *90 tab*1        Sig: Take 1 tablet (12.5 mg) by mouth daily    amLODIPine (NORVASC) 10 MG tablet [Pharmac*90 tab*1        Sig: TAKE 1 TABLET BY MOUTH EVERY DAY    Routing refill request to provider for review/approval because:  Labs out of range:    BP Readings from Last 3 Encounters:   05/10/21 (!) 150/90   04/23/21 (!) 162/100   03/06/20 132/74

## 2021-12-21 DIAGNOSIS — G44.209 TENSION HEADACHE: ICD-10-CM

## 2021-12-22 NOTE — TELEPHONE ENCOUNTER
Routing refill request to provider for review/approval because:  Drug not on the [facility] refill protocol     Isabela Davidson, RN, BSN

## 2021-12-23 RX ORDER — CYCLOBENZAPRINE HCL 10 MG
TABLET ORAL
Qty: 60 TABLET | Refills: 1 | Status: SHIPPED | OUTPATIENT
Start: 2021-12-23

## 2022-07-16 ENCOUNTER — HEALTH MAINTENANCE LETTER (OUTPATIENT)
Age: 37
End: 2022-07-16

## 2022-09-18 ENCOUNTER — HEALTH MAINTENANCE LETTER (OUTPATIENT)
Age: 37
End: 2022-09-18

## 2022-12-29 ENCOUNTER — OFFICE VISIT (OUTPATIENT)
Dept: FAMILY MEDICINE | Facility: CLINIC | Age: 37
End: 2022-12-29
Payer: COMMERCIAL

## 2022-12-29 VITALS
HEART RATE: 72 BPM | HEIGHT: 72 IN | OXYGEN SATURATION: 99 % | WEIGHT: 299.2 LBS | RESPIRATION RATE: 16 BRPM | TEMPERATURE: 98.6 F | BODY MASS INDEX: 40.52 KG/M2 | DIASTOLIC BLOOD PRESSURE: 97 MMHG | SYSTOLIC BLOOD PRESSURE: 154 MMHG

## 2022-12-29 DIAGNOSIS — Z13.6 CARDIOVASCULAR SCREENING; LDL GOAL LESS THAN 130: ICD-10-CM

## 2022-12-29 DIAGNOSIS — I10 ESSENTIAL HYPERTENSION: Primary | ICD-10-CM

## 2022-12-29 DIAGNOSIS — E66.01 MORBID OBESITY (H): ICD-10-CM

## 2022-12-29 LAB
ALBUMIN SERPL-MCNC: 4.1 G/DL (ref 3.4–5)
ANION GAP SERPL CALCULATED.3IONS-SCNC: 6 MMOL/L (ref 3–14)
BUN SERPL-MCNC: 13 MG/DL (ref 7–30)
CALCIUM SERPL-MCNC: 9.4 MG/DL (ref 8.5–10.1)
CHLORIDE BLD-SCNC: 104 MMOL/L (ref 94–109)
CHOLEST SERPL-MCNC: 212 MG/DL
CO2 SERPL-SCNC: 29 MMOL/L (ref 20–32)
CREAT SERPL-MCNC: 0.91 MG/DL (ref 0.66–1.25)
FASTING STATUS PATIENT QL REPORTED: YES
GFR SERPL CREATININE-BSD FRML MDRD: >90 ML/MIN/1.73M2
GLUCOSE BLD-MCNC: 148 MG/DL (ref 70–99)
HDLC SERPL-MCNC: 35 MG/DL
LDLC SERPL CALC-MCNC: 149 MG/DL
NONHDLC SERPL-MCNC: 177 MG/DL
PHOSPHATE SERPL-MCNC: 3.5 MG/DL (ref 2.5–4.5)
POTASSIUM BLD-SCNC: 3.9 MMOL/L (ref 3.4–5.3)
SODIUM SERPL-SCNC: 139 MMOL/L (ref 133–144)
TRIGL SERPL-MCNC: 139 MG/DL

## 2022-12-29 PROCEDURE — 36415 COLL VENOUS BLD VENIPUNCTURE: CPT | Performed by: FAMILY MEDICINE

## 2022-12-29 PROCEDURE — 90686 IIV4 VACC NO PRSV 0.5 ML IM: CPT | Performed by: FAMILY MEDICINE

## 2022-12-29 PROCEDURE — 90471 IMMUNIZATION ADMIN: CPT | Performed by: FAMILY MEDICINE

## 2022-12-29 PROCEDURE — 80069 RENAL FUNCTION PANEL: CPT | Performed by: FAMILY MEDICINE

## 2022-12-29 PROCEDURE — 80061 LIPID PANEL: CPT | Performed by: FAMILY MEDICINE

## 2022-12-29 PROCEDURE — 99214 OFFICE O/P EST MOD 30 MIN: CPT | Mod: 25 | Performed by: FAMILY MEDICINE

## 2022-12-29 ASSESSMENT — PAIN SCALES - GENERAL: PAINLEVEL: MILD PAIN (2)

## 2022-12-29 NOTE — PROGRESS NOTES
ASSESSMENT / PLAN:  (I10) Essential hypertension  (primary encounter diagnosis)  Comment: a little high but off norvasc  Plan: Renal panel        Continue meds and self-monitor. Exercise and lower sodium. Recheck in 6 months  Sooner if worse. Chest pain or shortness of breath to er    (Z13.6) CARDIOVASCULAR SCREENING; LDL GOAL LESS THAN 130    Plan: Lipid Profile        Lower carb/higher protein diet.      (E66.01) Morbid obesity (H)  Comment: needs help  Plan: join gym. Lower carbs. Consider weight loss meds. Recheck in 6 months  ?jardiance?          Subjective   Nate is a 37 year old presenting for the following health issues:  History htn , tension ha, hyperglycemia and morbid obesity.   Outside blood pressure slightly higher. No family history htn. Parents alive no mi.   GM -mi -mid 70.   Not monitoring sodium. No chest pain or shortness of breath.   Needs exercise. Emotionally - stressed work.   Single. No kids. Sleeping overall.  No cady noted but some snoring.   No keto diet. Non-smoker.  Will join gym.   Ran out of norvasc 10 days. Blood pressure was ok on both.   No chief complaint on file.      History of Present Illness       Hypertension: He presents for follow up of hypertension.  He does check blood pressure  regularly outside of the clinic. Outside blood pressures have been over 140/90. He does not follow a low salt diet.       Has been out of Norvasc for about 10 days.        Review of Systems         Objective    There were no vitals taken for this visit.  There is no height or weight on file to calculate BMI.   BP (!) 154/97   Pulse 72   Temp 98.6  F (37  C) (Oral)   Resp 16   Ht 1.829 m (6')   Wt 135.7 kg (299 lb 3.2 oz)   SpO2 99%   BMI 40.58 kg/m     Physical Exam   GENERAL: healthy, alert and no distress  EYES: Eyes grossly normal to inspection, PERRL and conjunctivae and sclerae normal  NECK: no adenopathy, no asymmetry, masses, or scars and thyroid normal to palpation  RESP: lungs clear  to auscultation - no rales, rhonchi or wheezes  CV: regular rate and rhythm, normal S1 S2, no S3 or S4, no murmur, click or rub, no peripheral edema and peripheral pulses strong  ABDOMEN: soft, nontender, no hepatosplenomegaly, no masses and bowel sounds normal  MS: no gross musculoskeletal defects noted, no edema  PSYCH: mentation appears normal, affect normal/bright

## 2022-12-30 DIAGNOSIS — I10 ESSENTIAL HYPERTENSION: ICD-10-CM

## 2023-01-02 ENCOUNTER — TELEPHONE (OUTPATIENT)
Dept: FAMILY MEDICINE | Facility: CLINIC | Age: 38
End: 2023-01-02

## 2023-01-02 DIAGNOSIS — I10 ESSENTIAL HYPERTENSION: ICD-10-CM

## 2023-01-02 RX ORDER — HYDROCHLOROTHIAZIDE 12.5 MG/1
12.5 TABLET ORAL DAILY
Qty: 90 TABLET | Refills: 1 | Status: CANCELLED | OUTPATIENT
Start: 2023-01-02

## 2023-01-02 RX ORDER — AMLODIPINE BESYLATE 10 MG/1
10 TABLET ORAL DAILY
Qty: 90 TABLET | Refills: 1 | Status: CANCELLED | OUTPATIENT
Start: 2023-01-02

## 2023-01-02 NOTE — TELEPHONE ENCOUNTER
Medication Question or Refill    Contacts       Type Contact Phone/Fax    01/02/2023 10:48 AM CST Phone (Incoming) Nate Ennis (Self)           What medication are you calling about (include dose and sig)?: hydrochlorothiazide (HYDRODIURIL) 12.5 MG tablet  amLODIPine (NORVASC) 10 MG tablet      Controlled Substance Agreement on file:   CSA -- Patient Level:    CSA: None found at the patient level.       Who prescribed the medication?: [facility]    Do you need a refill? Yes:     When did you use the medication last? daily    Patient offered an appointment? No    Do you have any questions or concerns?  No    Preferred Pharmacy:  [facility] #[id number] - [place], [place] - [address]  [address]  [place] [address]  Phone: [phone] Fax: [phone]      Could we send this information to you in [employer] or would you prefer to receive a phone call?:   Patient would prefer a phone call   Okay to leave a detailed message?: Yes at Cell number on file:    Telephone Information:   Mobile [phone]

## 2023-01-03 ENCOUNTER — MYC MEDICAL ADVICE (OUTPATIENT)
Dept: FAMILY MEDICINE | Facility: CLINIC | Age: 38
End: 2023-01-03

## 2023-01-03 DIAGNOSIS — I10 ESSENTIAL HYPERTENSION: ICD-10-CM

## 2023-01-03 RX ORDER — AMLODIPINE BESYLATE 10 MG/1
10 TABLET ORAL DAILY
Qty: 90 TABLET | Refills: 1 | Status: SHIPPED | OUTPATIENT
Start: 2023-01-03 | End: 2023-09-11

## 2023-01-03 RX ORDER — AMLODIPINE BESYLATE 10 MG/1
TABLET ORAL
Qty: 90 TABLET | Refills: 1 | OUTPATIENT
Start: 2023-01-03

## 2023-01-03 RX ORDER — HYDROCHLOROTHIAZIDE 12.5 MG/1
12.5 TABLET ORAL DAILY
Qty: 90 TABLET | Refills: 1 | OUTPATIENT
Start: 2023-01-03

## 2023-01-03 RX ORDER — HYDROCHLOROTHIAZIDE 12.5 MG/1
12.5 TABLET ORAL DAILY
Qty: 90 TABLET | Refills: 1 | Status: SHIPPED | OUTPATIENT
Start: 2023-01-03 | End: 2023-09-11

## 2023-05-11 ENCOUNTER — OFFICE VISIT (OUTPATIENT)
Dept: PODIATRY | Facility: CLINIC | Age: 38
End: 2023-05-11
Payer: COMMERCIAL

## 2023-05-11 VITALS
SYSTOLIC BLOOD PRESSURE: 155 MMHG | HEART RATE: 78 BPM | BODY MASS INDEX: 37.97 KG/M2 | WEIGHT: 280 LBS | DIASTOLIC BLOOD PRESSURE: 100 MMHG

## 2023-05-11 DIAGNOSIS — L60.0 INGROWING NAIL: Primary | ICD-10-CM

## 2023-05-11 PROCEDURE — 99203 OFFICE O/P NEW LOW 30 MIN: CPT | Mod: 25 | Performed by: PODIATRIST

## 2023-05-11 PROCEDURE — 11730 AVULSION NAIL PLATE SIMPLE 1: CPT | Mod: T5 | Performed by: PODIATRIST

## 2023-05-11 NOTE — PATIENT INSTRUCTIONS
We wish you continued good healing. If you have any questions or concerns, please do not hesitate to contact us at  [phone]    [employer] (secure e-mail communication and access to your chart) to send a message or to make an appointment.    Please remember to call and schedule a follow up appointment if one was recommended at your earliest convenience.     PODIATRY CLINIC HOURS  TELEPHONE NUMBER    Dr. Vikas TELLEZPCLEMENTE FACFAS        Clinics:  Renan Best  [facility], MARYA Benitez, ROSE Best/Renan  Tuesday 1PM-6PM  [facility]  Wednesday 745AM-330PM  [place]  Thursday/Friday 745AM-230PM  [place]   1st and 3rd Mondays  845-430 PM   [place] APPOINTMENTS  [phone]    [place] APPOINTMENTS  [phone]    [place] APPOINTMENTS  [phone]        If you need a medication refill, please contact us you may need lab work and/or a follow up visit prior to your refill (i.e. Antifungal medications).  If MRI needed please call Imaging at [phone]   HOW DO I GET MY KNEE SCOOTER? Knee scooters can be picked up at ANY Medical Supply stores with your knee scooter Prescription.  OR  Bring your signed prescription to an [facility] showroom.  Call or bring in your Orthotics order to any Orthotics locations. Or call [phone]         INGROWN TOENAIL REMOVAL AFTERCARE     Go directly home and elevate the affected foot on one or two pillows for the remainder of the day/evening if possible. Your toe may stay numb anywhere from 2-8 hours.   Take Tylenol, ibuprofen or another anti-inflammatory as needed for pain.   That evening of the procedure,  you may remove the bandage.(you may soak it in warm soapy water ) After soaks, pat the area dry and then allow to airdry for a few minutes. Apply antibiotic ointment to the area and cover with  band-aid.  The following day. Find a shoe that is comfortable and minimizes the amount of rubbing  on your toe, as this increases pain.  Dress with band-aids until no longer draining, typically 3 days.  Watch for any signs and symptoms of infection such as: redness, red streaks going up the foot/leg, swelling, pus or foul odor. Fevers > 101   Please call with questions.    Dr. Vikas Garner D.P.M FAC FAS

## 2023-05-11 NOTE — PROGRESS NOTES
Subjective:    Pt is seen today as a new pt referral w/ the c/c of a painful ingrown right great nail lateral border.  This has been problematic for 10 day(s).  positivehistory of drainage from the site. This is slowly getting worse.  Aggravated by activity and relieved by rest.  Has tried soaking which has not helped.   Started when patient tore her nail back too short.  Denies fever and chill, denies numbness and tingling, denies erythema on dorsum of foot.  Works on his feet as a .    ROS:  see above    Past Medical History:   Diagnosis Date     Hypertension        Past Surgical History:   Procedure Laterality Date     APPENDECTOMY       DISCECTOMY LUMBAR POSTERIOR MICROSCOPIC ONE LEVEL Right 3/4/2020    Procedure: RIGHT L4-L5 HEMILAMINECTOMY AND MICRODISCECTOMY;  Surgeon: Agus Can MD;  Location: [facility] OR     UPPER GI ENDOSCOPY         Family History   Problem Relation Age of Onset     Diabetes Father      Diabetes Paternal Grandfather      Cancer Mother        Social History     Tobacco Use     Smoking status: Never     Smokeless tobacco: Never     Tobacco comments:     none    Vaping Use     Vaping status: Never Used   Substance Use Topics     Alcohol use: Not Currently     Comment: none          Current Outpatient Medications:      amLODIPine (NORVASC) 10 MG tablet, Take 1 tablet (10 mg) by mouth daily, Disp: 90 tablet, Rfl: 1     hydrochlorothiazide (HYDRODIURIL) 12.5 MG tablet, Take 1 tablet (12.5 mg) by mouth daily, Disp: 90 tablet, Rfl: 1     cyclobenzaprine (FLEXERIL) 10 MG tablet, TAKE 1 TABLET BY MOUTH 3 TIMES DAILY AS NEEDED FOR MUSCLE SPASMS (Patient not taking: Reported on 12/29/2022), Disp: 60 tablet, Rfl: 1       Allergies   Allergen Reactions     Compazine [Prochlorperazine]      unknown       BP (!) 155/100   Pulse 78   Wt 127 kg (280 lb)   BMI 37.97 kg/m  .      Constitutional/ general:  Pt is in no apparent distress, appears well-nourished.  Cooperative with  history and physical exam.     Psych:  The patient answered questions appropriately.  Normal affect.  Seems to have reasonable expectations, in terms of treatment.     Lungs:  Non labored breathing, non labored speech. No cough.  No audible wheezing. Even, quiet breathing.       Vascular:  Pedal pulses are palpable bilaterally for both the DP and PT arteries.  CFT < 3 sec.  No ankle varicosities or edema.  Pedal hair growth noted.     Neuro:  Alert and oriented x 3. Coordinated gait.  Light touch sensation is intact     Derm: Normal texture and turgor.  No ecchymosis, or cyanosis.  Hair growth noted.        Musculoskeletal:     Patient is ambulatory without an assistive device or brace.   right great toe nail lateral border shows soft tissue impingement with localized erythema.   negative active drainage/purulence at this time.  No sinus tracts.  No nailbed masses or exostosis.  No pain with range of motion of IPJ or MTPJ.  No ascending cellulitis.    ASSESSMENT:    Onychocryptosis with paronychia right toe.    Discussed etiology and treatment options in detail w/ the pt.  The potential causes and nature of an ingrown toenail were discussed with the patient.  We reviewed the natural history/prognosis of the condition and potential risks if no treatment is provided.      After thorough discussion and answering all questions, the patient elected to have nail avulsion.  Obtained consent, used 3cc of 1% lidocaine plain to block right first toe.  Sterile prep, then avulsed the affected border(s).  No evidence of deep abscess noted.  Pt tolerated procedure well.  Sterile bandage placed, gave wound care instruction.  Instructed patient on trimming nails correctly.  They will avoid tight shoes.  Avoid pedicures.  Discussed permanent removal of border if chronic problem.  Return to clinic prn.    Vikas Garner DPM, BOB

## 2023-05-11 NOTE — LETTER
5/11/2023         RE: Drew Ennis  [address]  [place] [address]        Dear Colleague,    Thank you for referring your patient, Drew Ennis, to the [facility]. Please see a copy of my visit note below.    Subjective:    Pt is seen today as a new pt referral w/ the c/c of a painful ingrown right great nail lateral border.  This has been problematic for 10 day(s).  positivehistory of drainage from the site. This is slowly getting worse.  Aggravated by activity and relieved by rest.  Has tried soaking which has not helped.   Started when patient tore her nail back too short.  Denies fever and chill, denies numbness and tingling, denies erythema on dorsum of foot.  Works on his feet as a .    ROS:  see above    Past Medical History:   Diagnosis Date     Hypertension        Past Surgical History:   Procedure Laterality Date     APPENDECTOMY       DISCECTOMY LUMBAR POSTERIOR MICROSCOPIC ONE LEVEL Right 3/4/2020    Procedure: RIGHT L4-L5 HEMILAMINECTOMY AND MICRODISCECTOMY;  Surgeon: Agus Can MD;  Location:  OR     UPPER GI ENDOSCOPY         Family History   Problem Relation Age of Onset     Diabetes Father      Diabetes Paternal Grandfather      Cancer Mother        Social History     Tobacco Use     Smoking status: Never     Smokeless tobacco: Never     Tobacco comments:     none    Vaping Use     Vaping status: Never Used   Substance Use Topics     Alcohol use: Not Currently     Comment: none          Current Outpatient Medications:      amLODIPine (NORVASC) 10 MG tablet, Take 1 tablet (10 mg) by mouth daily, Disp: 90 tablet, Rfl: 1     hydrochlorothiazide (HYDRODIURIL) 12.5 MG tablet, Take 1 tablet (12.5 mg) by mouth daily, Disp: 90 tablet, Rfl: 1     cyclobenzaprine (FLEXERIL) 10 MG tablet, TAKE 1 TABLET BY MOUTH 3 TIMES DAILY AS NEEDED FOR MUSCLE SPASMS (Patient not taking: Reported on 12/29/2022), Disp: 60 tablet, Rfl: 1       Allergies    Allergen Reactions     Compazine [Prochlorperazine]      unknown       BP (!) 155/100   Pulse 78   Wt 127 kg (280 lb)   BMI 37.97 kg/m  .      Constitutional/ general:  Pt is in no apparent distress, appears well-nourished.  Cooperative with history and physical exam.     Psych:  The patient answered questions appropriately.  Normal affect.  Seems to have reasonable expectations, in terms of treatment.     Lungs:  Non labored breathing, non labored speech. No cough.  No audible wheezing. Even, quiet breathing.       Vascular:  Pedal pulses are palpable bilaterally for both the DP and PT arteries.  CFT < 3 sec.  No ankle varicosities or edema.  Pedal hair growth noted.     Neuro:  Alert and oriented x 3. Coordinated gait.  Light touch sensation is intact     Derm: Normal texture and turgor.  No ecchymosis, or cyanosis.  Hair growth noted.        Musculoskeletal:     Patient is ambulatory without an assistive device or brace.   right great toe nail lateral border shows soft tissue impingement with localized erythema.   negative active drainage/purulence at this time.  No sinus tracts.  No nailbed masses or exostosis.  No pain with range of motion of IPJ or MTPJ.  No ascending cellulitis.    ASSESSMENT:    Onychocryptosis with paronychia right toe.    Discussed etiology and treatment options in detail w/ the pt.  The potential causes and nature of an ingrown toenail were discussed with the patient.  We reviewed the natural history/prognosis of the condition and potential risks if no treatment is provided.      After thorough discussion and answering all questions, the patient elected to have nail avulsion.  Obtained consent, used 3cc of 1% lidocaine plain to block right first toe.  Sterile prep, then avulsed the affected border(s).  No evidence of deep abscess noted.  Pt tolerated procedure well.  Sterile bandage placed, gave wound care instruction.  Instructed patient on trimming nails correctly.  They will avoid  tight shoes.  Avoid pedicures.  Discussed permanent removal of border if chronic problem.  Return to clinic prn.    Vikas Garner DPM, FACFAS          Again, thank you for allowing me to participate in the care of your patient.        Sincerely,        Vikas Garner DPM

## 2023-05-15 ENCOUNTER — MYC MEDICAL ADVICE (OUTPATIENT)
Dept: PODIATRY | Facility: CLINIC | Age: 38
End: 2023-05-15
Payer: COMMERCIAL

## 2023-07-30 ENCOUNTER — HEALTH MAINTENANCE LETTER (OUTPATIENT)
Age: 38
End: 2023-07-30

## 2023-09-11 PROBLEM — Z80.0 FAMILY HISTORY OF COLON CANCER: Status: ACTIVE | Noted: 2023-09-11

## 2023-09-11 PROBLEM — R06.83 SNORING: Status: ACTIVE | Noted: 2023-09-11

## 2023-09-14 ENCOUNTER — TELEPHONE (OUTPATIENT)
Dept: GASTROENTEROLOGY | Facility: CLINIC | Age: 38
End: 2023-09-14
Payer: COMMERCIAL

## 2023-09-14 NOTE — TELEPHONE ENCOUNTER
"Endoscopy Scheduling Screen    Have you had a positive Covid test in the last 14 days?  No    Are you active on MyChart?   Yes    What insurance is in the chart?  Other:  [employer]      Ordering/Referring Provider: SHREYAS BRITO     (If ordering provider performs procedure, schedule with ordering provider unless otherwise instructed. )    BMI: Estimated body mass index is 37.3 kg/m  as calculated from the following:    Height as of 9/11/23: 1.829 m (6').    Weight as of 9/11/23: 124.7 kg (275 lb).     Sedation Ordered  moderate sedation.   If patient BMI > 50 do not schedule in [facility].    If patient BMI > 45 do not schedule at [facility].    Are you taking methadone or Suboxone?  No    Are you taking any prescription medications for pain 3 or more times per week?   No    Do you have a history of malignant hyperthermia or adverse reaction to anesthesia?  No    (Females) Are you currently pregnant?   No     Have you been diagnosed or told you have pulmonary hypertension?   No    Do you have an LVAD?  No    Have you been told you have moderate to severe sleep apnea?  No    Have you been told you have COPD, asthma, or any other lung disease?  No    Do you have any heart conditions?  No     Have you ever had an organ transplant?   No    Have you ever had or are you awaiting a heart or lung transplant?   No    Have you had a stroke or transient ischemic attack (TIA aka \"mini stroke\" in the last 6 months?   No    Have you been diagnosed with or been told you have cirrhosis of the liver?   No    Are you currently on dialysis?   No    Do you need assistance transferring?   No    BMI: Estimated body mass index is 37.3 kg/m  as calculated from the following:    Height as of 9/11/23: 1.829 m (6').    Weight as of 9/11/23: 124.7 kg (275 lb).     Is patients BMI > 40 and scheduling location [facility]?  No    Do you take an injectable medication for weight loss or diabetes (excluding insulin)?  No    Do you take the medication Naltrexone?  No    Do " you take blood thinners?  No       Prep   Are you currently on dialysis or do you have chronic kidney disease?  No    Do you have a diagnosis of diabetes?  No    Do you have a diagnosis of cystic fibrosis (CF)?  No    On a regular basis do you go 3 -5 days between bowel movements?  No    BMI > 40?  No    Preferred Pharmacy:    [id number] - [place], [place] - [address]  [address] [address]  Phone: [phone] Fax: [phone]      Final Scheduling Details   Colonoscopy prep sent?  Standard MiraLAX    Procedure scheduled  Colonoscopy    Surgeon:  NIRU      Date of procedure:  11/09/2023     Pre-OP / PAC:   No - Not required for this site.    Location  [facility] - [facility] - Per order.    Sedation   Moderate Sedation - Per order.      Patient Reminders:   You will receive a call from a Nurse to review instructions and health history.  This assessment must be completed prior to your procedure.  Failure to complete the Nurse assessment may result in the procedure being cancelled.      On the day of your procedure, please designate an adult(s) who can drive you home stay with you for the next 24 hours. The medicines used in the exam will make you sleepy. You will not be able to drive.      You cannot take public transportation, ride share services, or non-medical taxi service without a responsible caregiver.  Medical transport services are allowed with the requirement that a responsible caregiver will receive you at your destination.  We require that drivers and caregivers are confirmed prior to your procedure.

## 2023-10-06 ENCOUNTER — LAB (OUTPATIENT)
Dept: LAB | Facility: CLINIC | Age: 38
End: 2023-10-06
Payer: COMMERCIAL

## 2023-10-06 DIAGNOSIS — R73.9 HYPERGLYCEMIA: ICD-10-CM

## 2023-10-06 DIAGNOSIS — E66.01 MORBID OBESITY (H): Primary | ICD-10-CM

## 2023-10-06 DIAGNOSIS — Z00.00 ROUTINE HISTORY AND PHYSICAL EXAMINATION OF ADULT: ICD-10-CM

## 2023-10-06 DIAGNOSIS — E78.5 HYPERLIPIDEMIA LDL GOAL <130: ICD-10-CM

## 2023-10-06 DIAGNOSIS — E66.01 MORBID OBESITY (H): ICD-10-CM

## 2023-10-06 LAB
ALBUMIN SERPL BCG-MCNC: 4.5 G/DL (ref 3.5–5.2)
ALP SERPL-CCNC: 61 U/L (ref 40–129)
ALT SERPL W P-5'-P-CCNC: 51 U/L (ref 0–70)
ANION GAP SERPL CALCULATED.3IONS-SCNC: 10 MMOL/L (ref 7–15)
AST SERPL W P-5'-P-CCNC: 39 U/L (ref 0–45)
BILIRUB SERPL-MCNC: 0.5 MG/DL
BUN SERPL-MCNC: 12.7 MG/DL (ref 6–20)
CALCIUM SERPL-MCNC: 9.8 MG/DL (ref 8.6–10)
CHLORIDE SERPL-SCNC: 101 MMOL/L (ref 98–107)
CHOLEST SERPL-MCNC: 198 MG/DL
CREAT SERPL-MCNC: 0.85 MG/DL (ref 0.67–1.17)
DEPRECATED HCO3 PLAS-SCNC: 25 MMOL/L (ref 22–29)
EGFRCR SERPLBLD CKD-EPI 2021: >90 ML/MIN/1.73M2
ERYTHROCYTE [DISTWIDTH] IN BLOOD BY AUTOMATED COUNT: 12.5 % (ref 10–15)
GLUCOSE SERPL-MCNC: 167 MG/DL (ref 70–99)
HCT VFR BLD AUTO: 42.8 % (ref 40–53)
HDLC SERPL-MCNC: 33 MG/DL
HGB BLD-MCNC: 15 G/DL (ref 13.3–17.7)
LDLC SERPL CALC-MCNC: 146 MG/DL
MCH RBC QN AUTO: 29 PG (ref 26.5–33)
MCHC RBC AUTO-ENTMCNC: 35 G/DL (ref 31.5–36.5)
MCV RBC AUTO: 83 FL (ref 78–100)
NONHDLC SERPL-MCNC: 165 MG/DL
PLATELET # BLD AUTO: 343 10E3/UL (ref 150–450)
POTASSIUM SERPL-SCNC: 4.2 MMOL/L (ref 3.4–5.3)
PROT SERPL-MCNC: 7.7 G/DL (ref 6.4–8.3)
RBC # BLD AUTO: 5.18 10E6/UL (ref 4.4–5.9)
SODIUM SERPL-SCNC: 136 MMOL/L (ref 135–145)
TRIGL SERPL-MCNC: 93 MG/DL
WBC # BLD AUTO: 7.1 10E3/UL (ref 4–11)

## 2023-10-06 PROCEDURE — 80061 LIPID PANEL: CPT

## 2023-10-06 PROCEDURE — 36415 COLL VENOUS BLD VENIPUNCTURE: CPT

## 2023-10-06 PROCEDURE — 83036 HEMOGLOBIN GLYCOSYLATED A1C: CPT

## 2023-10-06 PROCEDURE — 85027 COMPLETE CBC AUTOMATED: CPT

## 2023-10-06 PROCEDURE — 80053 COMPREHEN METABOLIC PANEL: CPT

## 2023-10-09 DIAGNOSIS — E11.9 TYPE 2 DIABETES MELLITUS WITHOUT COMPLICATION, WITHOUT LONG-TERM CURRENT USE OF INSULIN (H): Primary | ICD-10-CM

## 2023-10-09 LAB — HBA1C MFR BLD: 9.5 % (ref 0–5.6)

## 2023-10-09 RX ORDER — METFORMIN HCL 500 MG
500 TABLET, EXTENDED RELEASE 24 HR ORAL
Qty: 90 TABLET | Refills: 1 | Status: SHIPPED | OUTPATIENT
Start: 2023-10-09 | End: 2024-04-18

## 2023-10-11 ENCOUNTER — NURSE TRIAGE (OUTPATIENT)
Dept: INTERNAL MEDICINE | Facility: CLINIC | Age: 38
End: 2023-10-11
Payer: COMMERCIAL

## 2023-10-11 NOTE — TELEPHONE ENCOUNTER
Spoke with patient. He started taking metformin 2 days ago. Has completely change his diet and started an intense physical exercise.  Reports feeling ready shaky and dizzy.  Had some oreos and that helped a little.  Had lunch which helped even more.  Still feels really aware of surroundings and fatigued.  Dizziness and shaking is improving.     Does have a meter and was able to test about 1.5 hours after meal: 149    Will have him rest and continue current medication for now. Probably combination of above activities dropped his blood sugar.  Patient is to return call if symptoms worsen or do not improve completely.    Will route to provider for any additional changes.    Johnny Mason, MSN, RN, PHN  [facility] ~ Registered Nurse  Clinic Triage ~ [place] [facility] [place]  October 11, 2023    Reason for Disposition   Dizziness from low blood sugar (i.e., < 60 mg/dl or 3.5 mmol/l)   Low blood sugar prevention, questions about    Additional Information   Negative: SEVERE difficulty breathing (e.g., struggling for each breath, speaks in single words)   Negative: Shock suspected (e.g., cold/pale/clammy skin, too weak to stand, low BP, rapid pulse)   Negative: Difficult to awaken or acting confused (e.g., disoriented, slurred speech)   Negative: Fainted, and still feels dizzy afterwards   Negative: Overdose (accidental or intentional) of medications   Negative: New neurologic deficit that is present now: * Weakness of the face, arm, or leg on one side of the body * Numbness of the face, arm, or leg on one side of the body * Loss of speech or garbled speech   Negative: Heart beating < 50 beats per minute OR > 140 beats per minute   Negative: Sounds like a life-threatening emergency to the triager   Negative: Unconscious or difficult to awaken   Negative: Seizure occurs   Negative: Acting confused (e.g., disoriented, slurred speech)   Negative: Very weak (can't stand)   Negative: Sounds like a life-threatening emergency  to the triager   Negative: Vomiting and signs of dehydration (e.g., very dry mouth, lightheaded, dark urine, etc.)   Negative: Low blood sugar symptoms persist > 30 minutes AND using low blood sugar Care Advice   Negative: Low blood glucose (70 mg/dl [3.9 mmol/l] or below) persists > 30 minutes AND using low blood sugar Care Advice   Negative: Patient sounds very sick or weak to the triager   Negative: Diabetes medication overdose (e.g., insulin error) and triager unable to answer question   Negative: Caller has URGENT medication or insulin pump question and triager unable to answer question   Negative: Low blood sugar symptoms with no other adult present AND hasn't tried Care Advice   Negative: Low blood glucose (70 mg/dl [3.9 mmol/l] or below) with no other adult present AND hasn't tried Care Advice   Negative: Low blood glucose (70 mg/dl [3.9 mmol/l] or below) OR symptomatic, now improved with Care Advice AND cause unknown   Negative: Patient wants to be seen   Negative: Morning (before breakfast) blood glucose < 80 mg/dL (4.4 mmol/L) and more than once in past week   Negative: Evening (after bedtime snack) blood glucose < 100 mg/dL (5.6 mmol/L) and more than once in past week   Negative: Caller has NON-URGENT medication or insulin pump question and triager unable to answer question   Negative: Blood glucose 70 mg/dl (3.9 mmol/l) or below, OR symptomatic, AND cause known    Protocols used: Dizziness-A-OH, Diabetes - Low Blood Sugar-A-OH

## 2023-10-26 ENCOUNTER — ALLIED HEALTH/NURSE VISIT (OUTPATIENT)
Dept: EDUCATION SERVICES | Facility: OTHER | Age: 38
End: 2023-10-26
Payer: COMMERCIAL

## 2023-10-26 DIAGNOSIS — E11.9 TYPE 2 DIABETES MELLITUS WITHOUT COMPLICATION, WITHOUT LONG-TERM CURRENT USE OF INSULIN (H): Primary | ICD-10-CM

## 2023-10-26 PROCEDURE — G0108 DIAB MANAGE TRN  PER INDIV: HCPCS

## 2023-10-26 NOTE — LETTER
10/26/2023         RE: Drew Ennis  [address]  [place] [address]        Dear Colleague,    Thank you for referring your patient, Drew Ennis, to the [facility]. Please see a copy of my visit note below.    Diabetes Self-Management Education & Support    Presents for: Initial Assessment for new diagnosis    Type of Service: In Person Visit    Assessment Type:   ASSESSMENT:  Pt referred by his pcp for diabetes self-mgmt education. Pt newly diagnosed with Type 2 Diabetes, Oct, 2023. Hx Htn and Obesity. Lives alone. Started taking Metformin as recommended: 500mg with dinner. Checking bg. Purchased generic meter at pharmacy. Checking, on the averages, every other day, alternating between fasting and 2 hours post meal.     Verbalizes he has significantly changed eating habits since diagnosis. Has cut out regular soda, decreased portion size (counting calories), has decreased salt and sugar intake. Has started to exercise. Has lost approx 15# in one month.     BG Log:   10/26: Post B: 124  10/24: Post B: 129  10/22:F: 115  10/20: Post D: 137  10/18: F: 110    Patient's most recent   Lab Results   Component Value Date    A1C 9.5 10/06/2023    A1C 6.4 04/23/2021     is not meeting goal of  < 6.5    Diabetes knowledge and skills assessment:   Patient is knowledgeable in diabetes management concepts related to: Needs new onset education.     Continue education with the following diabetes management concepts: Healthy Eating, Being Active, Monitoring, and Taking Medication    Based on learning assessment above, most appropriate setting for further diabetes education would be: Individual setting.      PLAN  - Increase protein and water intake  - Continue checking bg; preferably, once a day  - Continue healthy eating regimen  - Continue taking Metformin as prescribed    Topics to cover at upcoming visits: Healthy Eating, Being Active, Monitoring, and Taking Medication    Follow-up: Rtc  in 6 weeks for follow-up.     See Care Plan for co-developed, patient-state behavior change goals.  AVS provided for patient today.    Education Materials Provided:  [employer] Healthy Living with Diabetes Book      SUBJECTIVE/OBJECTIVE:  Presents for: Initial Assessment for new diagnosis  Accompanied by: Self  Diabetes education in the past 24mo: No  Focus of Visit: Patient Unsure  Diabetes type: Type 2  Date of diagnosis: 10/2023  Disease course: Stable  How confident are you filling out medical forms by yourself:: Not Assessed  Diabetes management related comments/concerns: Improving bg levels  Transportation concerns: No  Difficulty affording diabetes medication?: No  Difficulty affording diabetes testing supplies?: No  Other concerns:: None  Cultural Influences/Ethnic Background:  Not  or     Diabetes Symptoms & Complications:  Fatigue: Yes (Mild fatigue)  Neuropathy: No  Polydipsia: No  Polyphagia: No  Polyuria: Yes  Visual change: No  Slow healing wounds: No  Other: No  Symptom course: Stable  Weight trend: Stable  Complications assessed today?: No    Patient Problem List and Family Medical History reviewed for relevant medical history, current medical status, and diabetes risk factors.    Vitals:  There were no vitals taken for this visit.  Estimated body mass index is 37.3 kg/m  as calculated from the following:    Height as of 9/11/23: 1.829 m (6').    Weight as of 9/11/23: 124.7 kg (275 lb).   Last 3 BP:   BP Readings from Last 3 Encounters:   09/11/23 (!) 148/98   05/11/23 (!) 155/100   12/29/22 (!) 154/97       History   Smoking Status     Never   Smokeless Tobacco     Never       Labs:  Lab Results   Component Value Date    A1C 9.5 10/06/2023    A1C 6.4 04/23/2021     Lab Results   Component Value Date     10/06/2023     12/29/2022     04/23/2021     Lab Results   Component Value Date     10/06/2023    LDL 96 04/23/2021     HDL Cholesterol   Date Value  "Ref Range Status   04/23/2021 35 (L) >39 mg/dL Final     Direct Measure HDL   Date Value Ref Range Status   10/06/2023 33 (L) >=40 mg/dL Final   ]  GFR Estimate   Date Value Ref Range Status   10/06/2023 >90 >60 mL/min/1.73m2 Final   04/23/2021 >90 >60 mL/min/[1.73_m2] Final     Comment:     Non  GFR Calc  Starting 12/18/2018, serum creatinine based estimated GFR (eGFR) will be   calculated using the Chronic Kidney Disease Epidemiology Collaboration   (CKD-EPI) equation.       GFR Estimate If Black   Date Value Ref Range Status   04/23/2021 >90 >60 mL/min/[1.73_m2] Final     Comment:      GFR Calc  Starting 12/18/2018, serum creatinine based estimated GFR (eGFR) will be   calculated using the Chronic Kidney Disease Epidemiology Collaboration   (CKD-EPI) equation.       Lab Results   Component Value Date    CR 0.85 10/06/2023    CR 0.93 04/23/2021     No results found for: \"MICROALBUMIN\"    Healthy Eating:  Healthy Eating Assessed Today: Yes  Cultural/Shinto diet restrictions?: No  Meal planning/habits: Calorie counting, Low carb, Low salt, Smaller portions  How many times a week on average do you eat food made away from home (restaurant/take-out)?: 2  Breakfast: Since diagnosis - Atkins protein bar  Lunch: Since diagnosis: turkey lettuce wrap with extra turkey and sometimes adds a few chips  Dinner: Since diagnosis: Stir-delgado with rice cauliflower or lettuce wraps  Snacks: protein bar  Other: Since diagnosis has cut way back on eating out.  Beverages: Water, Coffee (Stopped regular soda one month ago. Likes flovored carbonated water.)  Has patient met with a dietitian in the past?: No    Being Active:  Being Active Assessed Today: Yes  Exercise:: Yes  Days per week of moderate to strenuous exercise (like a brisk walk): 3 (Has increased exercise since diagnosis  - 3 days a week, wlll do some cardio and weight lifting.)  On average, minutes per day of exercise at this level: 50  How " intense was your typical exercise? : Moderate (like brisk walking)  Exercise Minutes per Week: 150  Barrier to exercise: None    Monitoring:  Monitoring Assessed Today: Yes  Did patient bring glucose meter to appointment? : Yes  Blood Glucose Meter: Other (Metene)  Times checking blood sugar at home (number): 1  Times checking blood sugar at home (per): Day  Blood glucose trend: Decreasing    Taking Medications:  Diabetes Medication(s)       Biguanides       metFORMIN (GLUCOPHAGE XR) 500 MG 24 hr tablet    Take 1 tablet (500 mg) by mouth daily (with dinner)            Taking Medication Assessed Today: Yes  Current Treatments: Oral Medication (taken by mouth)  Problems taking diabetes medications regularly?: No  Diabetes medication side effects?: No    Problem Solving:  Problem Solving Assessed Today: No  Is the patient at risk for hypoglycemia?: No  Is the patient at risk for DKA?: No  Does patient have severe weather/disaster plan for diabetes management?: No  Does patient have sick day plan for diabetes management?: No    Reducing Risks:  Reducing Risks Assessed Today: No  Diabetes Risks: None  CAD Risks: Male sex, Obesity  Has dilated eye exam at least once a year?: No  Sees dentist every 6 months?: No  Feet checked by healthcare provider in the last year?: No    Healthy Coping:  Healthy Coping Assessed Today: Yes  Emotional response to diabetes: Ready to learn  Stage of change: PREPARATION (Decided to change - considering how)  Patient Activation Measure Survey Score:      11/13/2019     8:44 AM   TYE Score (Last Two)   TYE Raw Score 34   Activation Score 68.9   TYE Level 3       Care Plan and Education Provided:  See above.     Time Spent: 60 minutes  Encounter Type: Individual    Any diabetes medication dose changes were made via the CDE Protocol per the patient's primary care provider. A copy of this encounter was shared with the provider.    Maureen Tovar, RN, BSN, [facility]   Certified Diabetes Care & Education  Specialist  [facility]

## 2023-10-30 ENCOUNTER — TELEPHONE (OUTPATIENT)
Dept: GASTROENTEROLOGY | Facility: CLINIC | Age: 38
End: 2023-10-30
Payer: COMMERCIAL

## 2023-10-30 DIAGNOSIS — Z80.0 FAMILY HISTORY OF COLON CANCER: Primary | ICD-10-CM

## 2023-10-30 RX ORDER — BISACODYL 5 MG/1
TABLET, DELAYED RELEASE ORAL
Qty: 4 TABLET | Refills: 0 | Status: SHIPPED | OUTPATIENT
Start: 2023-10-30 | End: 2023-11-09

## 2023-10-30 NOTE — TELEPHONE ENCOUNTER
Pre visit planning completed.    *NOTE: Hemoglobin A1C 9.5. Staff message sent to [facility] to confirm ok with borderline value.     Addendum 10/30/23 10:43 AM   Response from [facility] ok to proceed.  --------------------------------------------------------------------------------    Procedure details:    Patient scheduled for Colonoscopy  on 11/9/23.     Arrival time: 1130. Procedure time 1215    Pre op exam needed? N/A    Facility location: [facility]; [address], [place] [address]    Sedation type: Conscious sedation     Indication for procedure: family hx of cancer      Chart review:     Electronic implanted devices? No    Recent diagnosis of diverticulitis within the last 6 weeks? No    Diabetic? Yes. See medication holding recommendations     Diabetic medication HOLDING recommendations: (if applicable)  Oral diabetic medications: Yes:  Metformin (glucophage): HOLD day of procedure.  Diabetic injectables: No  Insulin: No      Medication review:    Anticoagulants? No    NSAIDS? No    Other medication HOLDING recommendations:  N/A      Prep for procedure:     Bowel prep recommendation: Standard Golytely    Due to:  diabetes.     Prep instructions sent via [employer] Updated. Bowel prep script sent to [id number] - [address]        Ruth Salas RN  Endoscopy Procedure Pre Assessment RN  [phone] option 4

## 2023-10-30 NOTE — TELEPHONE ENCOUNTER
Pre assessment completed for upcoming procedure.   (Please see previous telephone encounter notes for complete details)      Procedure details:    Arrival time and facility location reviewed.    Pre op exam needed? N/A    Designated  policy reviewed. Instructed to have someone stay 6 hours post procedure.     COVID policy reviewed.      Medication review:    Medications reviewed. Please see supporting documentation below. Holding recommendations discussed (if applicable).       Prep for procedure:     Procedure prep instructions reviewed.        Additional information needed?  N/A      Patient  verbalized understanding and had no questions or concerns at this time.      Marla Smith RN  Endoscopy Procedure Pre Assessment RN  [phone] option 4

## 2023-11-01 NOTE — PROGRESS NOTES
Diabetes Self-Management Education & Support    Presents for: Initial Assessment for new diagnosis    Type of Service: In Person Visit    Assessment Type:   ASSESSMENT:  Pt referred by his pcp for diabetes self-mgmt education. Pt newly diagnosed with Type 2 Diabetes, Oct, 2023. Hx Htn and Obesity. Lives alone. Started taking Metformin as recommended: 500mg with dinner. Checking bg. Purchased generic meter at pharmacy. Checking, on the averages, every other day, alternating between fasting and 2 hours post meal.     Verbalizes he has significantly changed eating habits since diagnosis. Has cut out regular soda, decreased portion size (counting calories), has decreased salt and sugar intake. Has started to exercise. Has lost approx 15# in one month.     BG Log:   10/26: Post B: 124  10/24: Post B: 129  10/22:F: 115  10/20: Post D: 137  10/18: F: 110    Patient's most recent   Lab Results   Component Value Date    A1C 9.5 10/06/2023    A1C 6.4 04/23/2021     is not meeting goal of  < 6.5    Diabetes knowledge and skills assessment:   Patient is knowledgeable in diabetes management concepts related to: Needs new onset education.     Continue education with the following diabetes management concepts: Healthy Eating, Being Active, Monitoring, and Taking Medication    Based on learning assessment above, most appropriate setting for further diabetes education would be: Individual setting.      PLAN  - Increase protein and water intake  - Continue checking bg; preferably, once a day  - Continue healthy eating regimen  - Continue taking Metformin as prescribed    Topics to cover at upcoming visits: Healthy Eating, Being Active, Monitoring, and Taking Medication    Follow-up: Rtc in 6 weeks for follow-up.     See Care Plan for co-developed, patient-state behavior change goals.  AVS provided for patient today.    Education Materials Provided:   [employer] Healthy Living with Diabetes  Book      SUBJECTIVE/OBJECTIVE:  Presents for: Initial Assessment for new diagnosis  Accompanied by: Self  Diabetes education in the past 24mo: No  Focus of Visit: Patient Unsure  Diabetes type: Type 2  Date of diagnosis: 10/2023  Disease course: Stable  How confident are you filling out medical forms by yourself:: Not Assessed  Diabetes management related comments/concerns: Improving bg levels  Transportation concerns: No  Difficulty affording diabetes medication?: No  Difficulty affording diabetes testing supplies?: No  Other concerns:: None  Cultural Influences/Ethnic Background:  Not  or     Diabetes Symptoms & Complications:  Fatigue: Yes (Mild fatigue)  Neuropathy: No  Polydipsia: No  Polyphagia: No  Polyuria: Yes  Visual change: No  Slow healing wounds: No  Other: No  Symptom course: Stable  Weight trend: Stable  Complications assessed today?: No    Patient Problem List and Family Medical History reviewed for relevant medical history, current medical status, and diabetes risk factors.    Vitals:  There were no vitals taken for this visit.  Estimated body mass index is 37.3 kg/m  as calculated from the following:    Height as of 9/11/23: 1.829 m (6').    Weight as of 9/11/23: 124.7 kg (275 lb).   Last 3 BP:   BP Readings from Last 3 Encounters:   09/11/23 (!) 148/98   05/11/23 (!) 155/100   12/29/22 (!) 154/97       History   Smoking Status    Never   Smokeless Tobacco    Never       Labs:  Lab Results   Component Value Date    A1C 9.5 10/06/2023    A1C 6.4 04/23/2021     Lab Results   Component Value Date     10/06/2023     12/29/2022     04/23/2021     Lab Results   Component Value Date     10/06/2023    LDL 96 04/23/2021     HDL Cholesterol   Date Value Ref Range Status   04/23/2021 35 (L) >39 mg/dL Final     Direct Measure HDL   Date Value Ref Range Status   10/06/2023 33 (L) >=40 mg/dL Final   ]  GFR Estimate   Date Value Ref Range Status   10/06/2023 >90 >60  "mL/min/1.73m2 Final   04/23/2021 >90 >60 mL/min/[1.73_m2] Final     Comment:     Non  GFR Calc  Starting 12/18/2018, serum creatinine based estimated GFR (eGFR) will be   calculated using the Chronic Kidney Disease Epidemiology Collaboration   (CKD-EPI) equation.       GFR Estimate If Black   Date Value Ref Range Status   04/23/2021 >90 >60 mL/min/[1.73_m2] Final     Comment:      GFR Calc  Starting 12/18/2018, serum creatinine based estimated GFR (eGFR) will be   calculated using the Chronic Kidney Disease Epidemiology Collaboration   (CKD-EPI) equation.       Lab Results   Component Value Date    CR 0.85 10/06/2023    CR 0.93 04/23/2021     No results found for: \"MICROALBUMIN\"    Healthy Eating:  Healthy Eating Assessed Today: Yes  Cultural/Sabianist diet restrictions?: No  Meal planning/habits: Calorie counting, Low carb, Low salt, Smaller portions  How many times a week on average do you eat food made away from home (restaurant/take-out)?: 2  Breakfast: Since diagnosis - Atkins protein bar  Lunch: Since diagnosis: turkey lettuce wrap with extra turkey and sometimes adds a few chips  Dinner: Since diagnosis: Stir-delgado with rice cauliflower or lettuce wraps  Snacks: protein bar  Other: Since diagnosis has cut way back on eating out.  Beverages: Water, Coffee (Stopped regular soda one month ago. Likes flovored carbonated water.)  Has patient met with a dietitian in the past?: No    Being Active:  Being Active Assessed Today: Yes  Exercise:: Yes  Days per week of moderate to strenuous exercise (like a brisk walk): 3 (Has increased exercise since diagnosis  - 3 days a week, wlll do some cardio and weight lifting.)  On average, minutes per day of exercise at this level: 50  How intense was your typical exercise? : Moderate (like brisk walking)  Exercise Minutes per Week: 150  Barrier to exercise: None    Monitoring:  Monitoring Assessed Today: Yes  Did patient bring glucose meter to " appointment? : Yes  Blood Glucose Meter: Other (Metene)  Times checking blood sugar at home (number): 1  Times checking blood sugar at home (per): Day  Blood glucose trend: Decreasing    Taking Medications:  Diabetes Medication(s)       Biguanides       metFORMIN (GLUCOPHAGE XR) 500 MG 24 hr tablet    Take 1 tablet (500 mg) by mouth daily (with dinner)            Taking Medication Assessed Today: Yes  Current Treatments: Oral Medication (taken by mouth)  Problems taking diabetes medications regularly?: No  Diabetes medication side effects?: No    Problem Solving:  Problem Solving Assessed Today: No  Is the patient at risk for hypoglycemia?: No  Is the patient at risk for DKA?: No  Does patient have severe weather/disaster plan for diabetes management?: No  Does patient have sick day plan for diabetes management?: No    Reducing Risks:  Reducing Risks Assessed Today: No  Diabetes Risks: None  CAD Risks: Male sex, Obesity  Has dilated eye exam at least once a year?: No  Sees dentist every 6 months?: No  Feet checked by healthcare provider in the last year?: No    Healthy Coping:  Healthy Coping Assessed Today: Yes  Emotional response to diabetes: Ready to learn  Stage of change: PREPARATION (Decided to change - considering how)  Patient Activation Measure Survey Score:      11/13/2019     8:44 AM   TYE Score (Last Two)   TYE Raw Score 34   Activation Score 68.9   TYE Level 3       Care Plan and Education Provided:  See above.     Time Spent: 60 minutes  Encounter Type: Individual    Any diabetes medication dose changes were made via the CDE Protocol per the patient's primary care provider. A copy of this encounter was shared with the provider.    Maureen Tovar RN, BSN, CDCES   Certified Diabetes Care &   [facility]

## 2023-11-09 ENCOUNTER — HOSPITAL ENCOUNTER (OUTPATIENT)
Facility: AMBULATORY SURGERY CENTER | Age: 38
Discharge: HOME | End: 2023-11-09
Attending: INTERNAL MEDICINE | Admitting: INTERNAL MEDICINE
Payer: COMMERCIAL

## 2023-11-09 VITALS
TEMPERATURE: 98 F | DIASTOLIC BLOOD PRESSURE: 73 MMHG | RESPIRATION RATE: 16 BRPM | HEART RATE: 62 BPM | OXYGEN SATURATION: 100 % | SYSTOLIC BLOOD PRESSURE: 111 MMHG

## 2023-11-09 LAB
COLONOSCOPY: NORMAL
GLUCOSE BLDC GLUCOMTR-MCNC: 115 MG/DL (ref 70–99)

## 2023-11-09 PROCEDURE — G8918 PT W/O PREOP ORDER IV AB PRO: HCPCS

## 2023-11-09 PROCEDURE — 45380 COLONOSCOPY AND BIOPSY: CPT | Mod: XS

## 2023-11-09 PROCEDURE — 45385 COLONOSCOPY W/LESION REMOVAL: CPT

## 2023-11-09 PROCEDURE — G8907 PT DOC NO EVENTS ON DISCHARG: HCPCS

## 2023-11-09 PROCEDURE — 88305 TISSUE EXAM BY PATHOLOGIST: CPT | Performed by: PATHOLOGY

## 2023-11-09 RX ORDER — ONDANSETRON 4 MG/1
4 TABLET, ORALLY DISINTEGRATING ORAL EVERY 6 HOURS PRN
Status: DISCONTINUED | OUTPATIENT
Start: 2023-11-09 | End: 2023-11-10 | Stop reason: HOSPADM

## 2023-11-09 RX ORDER — LIDOCAINE 40 MG/G
CREAM TOPICAL
Status: DISCONTINUED | OUTPATIENT
Start: 2023-11-09 | End: 2023-11-10 | Stop reason: HOSPADM

## 2023-11-09 RX ORDER — NALOXONE HYDROCHLORIDE 0.4 MG/ML
0.2 INJECTION, SOLUTION INTRAMUSCULAR; INTRAVENOUS; SUBCUTANEOUS
Status: DISCONTINUED | OUTPATIENT
Start: 2023-11-09 | End: 2023-11-10 | Stop reason: HOSPADM

## 2023-11-09 RX ORDER — ONDANSETRON 2 MG/ML
4 INJECTION INTRAMUSCULAR; INTRAVENOUS
Status: DISCONTINUED | OUTPATIENT
Start: 2023-11-09 | End: 2023-11-10 | Stop reason: HOSPADM

## 2023-11-09 RX ORDER — FENTANYL CITRATE 50 UG/ML
INJECTION, SOLUTION INTRAMUSCULAR; INTRAVENOUS PRN
Status: DISCONTINUED | OUTPATIENT
Start: 2023-11-09 | End: 2023-11-09 | Stop reason: HOSPADM

## 2023-11-09 RX ORDER — NALOXONE HYDROCHLORIDE 0.4 MG/ML
0.4 INJECTION, SOLUTION INTRAMUSCULAR; INTRAVENOUS; SUBCUTANEOUS
Status: DISCONTINUED | OUTPATIENT
Start: 2023-11-09 | End: 2023-11-10 | Stop reason: HOSPADM

## 2023-11-09 RX ORDER — FLUMAZENIL 0.1 MG/ML
0.2 INJECTION, SOLUTION INTRAVENOUS
Status: DISCONTINUED | OUTPATIENT
Start: 2023-11-09 | End: 2023-11-10 | Stop reason: HOSPADM

## 2023-11-09 RX ORDER — ONDANSETRON 2 MG/ML
4 INJECTION INTRAMUSCULAR; INTRAVENOUS EVERY 6 HOURS PRN
Status: DISCONTINUED | OUTPATIENT
Start: 2023-11-09 | End: 2023-11-10 | Stop reason: HOSPADM

## 2023-11-09 NOTE — H&P
[facility]est[facility] Pre-op History and Physical    Drew Ennis MRN# [id number]   Age: 38 year old YOB: 1985            Date of Exam 11/9/2023         Primary care provider: Casey Moore         Chief Complaint and/or Reason for Procedure:     Mother with colon cancer diagnosed in her 40s. Maternal grandfather with CRC.         Active problem list:     Patient Active Problem List    Diagnosis Date Noted    Type 2 diabetes mellitus without complication, without long-term current use of insulin (H) 10/09/2023     Priority: Medium    Hyperlipidemia LDL goal <130 09/11/2023     Priority: Medium    Snoring 09/11/2023     Priority: Medium    Family history of colon cancer 09/11/2023     Priority: Medium    Essential hypertension 03/06/2020     Priority: Medium    Morbid obesity (H) 11/13/2019     Priority: Medium            Medications (include herbals and vitamins):   Any Plavix use in the last 7 days? No     Current Outpatient Medications   Medication Sig    amLODIPine (NORVASC) 10 MG tablet Take 1 tablet (10 mg) by mouth daily    hydrochlorothiazide (HYDRODIURIL) 12.5 MG tablet Take 1 tablet (12.5 mg) by mouth daily    losartan (COZAAR) 50 MG tablet Take 1 tablet (50 mg) by mouth daily    metFORMIN (GLUCOPHAGE XR) 500 MG 24 hr tablet Take 1 tablet (500 mg) by mouth daily (with dinner)    cyclobenzaprine (FLEXERIL) 10 MG tablet TAKE 1 TABLET BY MOUTH 3 TIMES DAILY AS NEEDED FOR MUSCLE SPASMS (Patient not taking: Reported on 12/29/2022)     Current Facility-Administered Medications   Medication    lidocaine (LMX4) kit    lidocaine 1 % 0.1-1 mL    ondansetron (ZOFRAN) injection 4 mg    sodium chloride (PF) 0.9% PF flush 3 mL    sodium chloride (PF) 0.9% PF flush 3 mL             Allergies:      Allergies   Allergen Reactions    Compazine [Prochlorperazine] Anxiety     unknown     Allergy to Latex? No  Allergy to tape?   No  Intolerances:             Physical Exam:   All vitals have been  reviewed  Patient Vitals for the past 8 hrs:   BP Temp Temp src Pulse Resp SpO2   11/09/23 1132 128/75 98  F (36.7  C) Temporal 82 16 97 %     No intake/output data recorded.  Lungs:   No increased work of breathing, good air exchange, clear to auscultation bilaterally, no crackles or wheezing     Cardiovascular:   normal S1 and S2             Lab / Radiology Results:            Anesthetic risk and/or ASA classification:     2  Rachel Doherty DO

## 2023-11-13 LAB
PATH REPORT.COMMENTS IMP SPEC: NORMAL
PATH REPORT.COMMENTS IMP SPEC: NORMAL
PATH REPORT.FINAL DX SPEC: NORMAL
PATH REPORT.GROSS SPEC: NORMAL
PATH REPORT.MICROSCOPIC SPEC OTHER STN: NORMAL
PATH REPORT.RELEVANT HX SPEC: NORMAL
PHOTO IMAGE: NORMAL

## 2024-01-08 ENCOUNTER — OFFICE VISIT (OUTPATIENT)
Dept: FAMILY MEDICINE | Facility: OTHER | Age: 39
End: 2024-01-08
Payer: COMMERCIAL

## 2024-01-08 VITALS
HEART RATE: 87 BPM | DIASTOLIC BLOOD PRESSURE: 88 MMHG | RESPIRATION RATE: 20 BRPM | SYSTOLIC BLOOD PRESSURE: 132 MMHG | WEIGHT: 270 LBS | BODY MASS INDEX: 37.8 KG/M2 | HEIGHT: 71 IN | OXYGEN SATURATION: 97 % | TEMPERATURE: 98.2 F

## 2024-01-08 DIAGNOSIS — E66.01 MORBID OBESITY (H): ICD-10-CM

## 2024-01-08 DIAGNOSIS — Z11.59 NEED FOR HEPATITIS C SCREENING TEST: ICD-10-CM

## 2024-01-08 DIAGNOSIS — I10 ESSENTIAL HYPERTENSION: ICD-10-CM

## 2024-01-08 DIAGNOSIS — E11.9 TYPE 2 DIABETES MELLITUS WITHOUT COMPLICATION, WITHOUT LONG-TERM CURRENT USE OF INSULIN (H): Primary | ICD-10-CM

## 2024-01-08 DIAGNOSIS — E78.5 HYPERLIPIDEMIA LDL GOAL <100: ICD-10-CM

## 2024-01-08 LAB
CREAT UR-MCNC: 156.9 MG/DL
HBA1C MFR BLD: 6.8 % (ref 0–5.6)
MICROALBUMIN UR-MCNC: <12 MG/L
MICROALBUMIN/CREAT UR: NORMAL MG/G{CREAT}

## 2024-01-08 PROCEDURE — 99214 OFFICE O/P EST MOD 30 MIN: CPT | Performed by: PHYSICIAN ASSISTANT

## 2024-01-08 PROCEDURE — 36415 COLL VENOUS BLD VENIPUNCTURE: CPT | Performed by: PHYSICIAN ASSISTANT

## 2024-01-08 PROCEDURE — 82043 UR ALBUMIN QUANTITATIVE: CPT | Performed by: PHYSICIAN ASSISTANT

## 2024-01-08 PROCEDURE — 83036 HEMOGLOBIN GLYCOSYLATED A1C: CPT | Performed by: PHYSICIAN ASSISTANT

## 2024-01-08 PROCEDURE — 82570 ASSAY OF URINE CREATININE: CPT | Performed by: PHYSICIAN ASSISTANT

## 2024-01-08 PROCEDURE — 83721 ASSAY OF BLOOD LIPOPROTEIN: CPT | Performed by: PHYSICIAN ASSISTANT

## 2024-01-08 PROCEDURE — 99207 PR FOOT EXAM NO CHARGE: CPT | Performed by: PHYSICIAN ASSISTANT

## 2024-01-08 ASSESSMENT — PAIN SCALES - GENERAL: PAINLEVEL: NO PAIN (0)

## 2024-01-08 NOTE — PROGRESS NOTES
Assessment & Plan     Type 2 diabetes mellitus without complication, without long-term current use of insulin (H)  Patient states he is made significant changes with respect to diabetes and has been seeing his average somewhere in the 130s.  Labs are pending at time of dictation.  We will adjust medications and treatment strategy based on results.  - Albumin Random Urine Quantitative with Creat Ratio; Future  - Adult Eye  Referral; Future  - HEMOGLOBIN A1C; Future  - FOOT EXAM  - LDL cholesterol direct; Future  - Albumin Random Urine Quantitative with Creat Ratio  - HEMOGLOBIN A1C  - LDL cholesterol direct    Need for hepatitis C screening test  Declined screening consider himself low risk.    Morbid obesity (H)  Still problematic.  Advised consideration of home sleep study that hopefully will be less expensive than the in-house versions.  Still believe that he could have sleep apnea.      Hyperlipidemia LDL goal <100  Corrected LDL goal for diabetes.  Follow-up based on results.   - FOOT EXAM  - LDL cholesterol direct; Future  - LDL cholesterol direct    Essential hypertension  Fairly good control with respect to this.  Advise decreasing the salt in his diet and increasing physical exercise.  Follow-up in 6 months.     Work on weight loss  Regular exercise  GRACE Shook [facility] YOVANI Sullivan is a 38 year old, presenting for the following health issues:  Diabetes      1/8/2024     3:52 PM   Additional Questions   Roomed by Isabela   Accompanied by Self         1/8/2024     3:52 PM   Patient Reported Additional Medications   Patient reports taking the following new medications NA       History of Present Illness       Diabetes:   He presents for follow up of diabetes.  He is checking home blood glucose a few times a week.   He checks blood glucose before meals, after meals, before and after meals and at bedtime.  Blood glucose is never over 200 and never under 70. He  "is aware of hypoglycemia symptoms including shakiness, dizziness and weakness.    He has no concerns regarding his diabetes at this time.   He is not experiencing numbness or burning in feet, excessive thirst, blurry vision, weight changes or redness, sores or blisters on feet. The patient has not had a diabetic eye exam in the last 12 months.          Hypertension: He presents for follow up of hypertension.  He does not check blood pressure  regularly outside of the clinic. Outpatient blood pressures have not been over 140/90. He does not follow a low salt diet.     He eats 0-1 servings of fruits and vegetables daily.He consumes 0 sweetened beverage(s) daily.He exercises with enough effort to increase his heart rate 20 to 29 minutes per day.  He exercises with enough effort to increase his heart rate 4 days per week. He is missing 1 dose(s) of medications per week.                 Review of Systems   Constitutional, HEENT, cardiovascular, pulmonary, GI, , musculoskeletal, neuro, skin, endocrine and psych systems are negative, except as otherwise noted.      Objective    /88   Pulse 87   Temp 98.2  F (36.8  C) (Temporal)   Resp 20   Ht 1.794 m (5' 10.63\")   Wt 122.5 kg (270 lb)   SpO2 97%   BMI 38.05 kg/m    Body mass index is 38.05 kg/m .  Physical Exam   GENERAL: healthy, alert and no distress  NECK: no adenopathy, no asymmetry, masses, or scars and thyroid normal to palpation  RESP: lungs clear to auscultation - no rales, rhonchi or wheezes  CV: regular rate and rhythm, normal S1 S2, no S3 or S4, no murmur, click or rub, no peripheral edema and peripheral pulses strong  ABDOMEN: soft, nontender, no hepatosplenomegaly, no masses and bowel sounds normal  MS: no gross musculoskeletal defects noted, no edema  Diabetic foot exam: Within normal limits today.    Labs pending at time of dictation.              "

## 2024-01-09 LAB — LDLC SERPL DIRECT ASSAY-MCNC: 125 MG/DL

## 2024-04-18 DIAGNOSIS — E11.9 TYPE 2 DIABETES MELLITUS WITHOUT COMPLICATION, WITHOUT LONG-TERM CURRENT USE OF INSULIN (H): ICD-10-CM

## 2024-04-18 RX ORDER — METFORMIN HCL 500 MG
500 TABLET, EXTENDED RELEASE 24 HR ORAL
Qty: 90 TABLET | Refills: 0 | Status: SHIPPED | OUTPATIENT
Start: 2024-04-18

## 2024-05-05 ENCOUNTER — HEALTH MAINTENANCE LETTER (OUTPATIENT)
Age: 39
End: 2024-05-05

## 2024-07-27 DIAGNOSIS — E11.9 TYPE 2 DIABETES MELLITUS WITHOUT COMPLICATION, WITHOUT LONG-TERM CURRENT USE OF INSULIN (H): ICD-10-CM

## 2024-08-20 ENCOUNTER — MYC REFILL (OUTPATIENT)
Dept: FAMILY MEDICINE | Facility: OTHER | Age: 39
End: 2024-08-20
Payer: COMMERCIAL

## 2024-08-20 DIAGNOSIS — E11.9 TYPE 2 DIABETES MELLITUS WITHOUT COMPLICATION, WITHOUT LONG-TERM CURRENT USE OF INSULIN (H): ICD-10-CM

## 2024-08-20 DIAGNOSIS — I10 ESSENTIAL HYPERTENSION: ICD-10-CM

## 2024-08-20 RX ORDER — METFORMIN HCL 500 MG
500 TABLET, EXTENDED RELEASE 24 HR ORAL
Qty: 90 TABLET | Refills: 0 | OUTPATIENT
Start: 2024-08-20

## 2024-08-20 RX ORDER — LOSARTAN POTASSIUM 50 MG/1
50 TABLET ORAL DAILY
Qty: 90 TABLET | Refills: 3 | Status: SHIPPED | OUTPATIENT
Start: 2024-08-20

## 2024-08-20 RX ORDER — AMLODIPINE BESYLATE 10 MG/1
10 TABLET ORAL DAILY
Qty: 90 TABLET | Refills: 3 | Status: SHIPPED | OUTPATIENT
Start: 2024-08-20

## 2024-08-20 RX ORDER — HYDROCHLOROTHIAZIDE 12.5 MG/1
12.5 TABLET ORAL DAILY
Qty: 90 TABLET | Refills: 3 | Status: SHIPPED | OUTPATIENT
Start: 2024-08-20

## 2024-09-22 ENCOUNTER — HEALTH MAINTENANCE LETTER (OUTPATIENT)
Age: 39
End: 2024-09-22

## 2024-10-25 RX ORDER — METFORMIN HYDROCHLORIDE 500 MG/1
500 TABLET, EXTENDED RELEASE ORAL
Qty: 90 TABLET | Refills: 0 | OUTPATIENT
Start: 2024-10-25

## 2024-11-21 ENCOUNTER — OFFICE VISIT (OUTPATIENT)
Dept: FAMILY MEDICINE | Facility: OTHER | Age: 39
End: 2024-11-21
Payer: COMMERCIAL

## 2024-11-21 VITALS
OXYGEN SATURATION: 98 % | HEIGHT: 71 IN | TEMPERATURE: 97.4 F | WEIGHT: 280 LBS | HEART RATE: 87 BPM | DIASTOLIC BLOOD PRESSURE: 82 MMHG | BODY MASS INDEX: 39.2 KG/M2 | SYSTOLIC BLOOD PRESSURE: 134 MMHG | RESPIRATION RATE: 18 BRPM

## 2024-11-21 DIAGNOSIS — E66.01 MORBID OBESITY (H): ICD-10-CM

## 2024-11-21 DIAGNOSIS — I10 ESSENTIAL HYPERTENSION: Primary | ICD-10-CM

## 2024-11-21 DIAGNOSIS — E11.9 TYPE 2 DIABETES MELLITUS WITHOUT COMPLICATION, WITHOUT LONG-TERM CURRENT USE OF INSULIN (H): ICD-10-CM

## 2024-11-21 DIAGNOSIS — E78.5 HYPERLIPIDEMIA LDL GOAL <100: ICD-10-CM

## 2024-11-21 LAB
ALBUMIN SERPL BCG-MCNC: 4.7 G/DL (ref 3.5–5.2)
ALP SERPL-CCNC: 72 U/L (ref 40–150)
ALT SERPL W P-5'-P-CCNC: 66 U/L (ref 0–70)
ANION GAP SERPL CALCULATED.3IONS-SCNC: 11 MMOL/L (ref 7–15)
AST SERPL W P-5'-P-CCNC: 36 U/L (ref 0–45)
BILIRUB SERPL-MCNC: 0.5 MG/DL
BUN SERPL-MCNC: 13.6 MG/DL (ref 6–20)
CALCIUM SERPL-MCNC: 9.5 MG/DL (ref 8.8–10.4)
CHLORIDE SERPL-SCNC: 101 MMOL/L (ref 98–107)
CHOLEST SERPL-MCNC: 205 MG/DL
CREAT SERPL-MCNC: 1.04 MG/DL (ref 0.67–1.17)
EGFRCR SERPLBLD CKD-EPI 2021: >90 ML/MIN/1.73M2
EST. AVERAGE GLUCOSE BLD GHB EST-MCNC: 194 MG/DL
FASTING STATUS PATIENT QL REPORTED: NO
FASTING STATUS PATIENT QL REPORTED: NO
GLUCOSE SERPL-MCNC: 133 MG/DL (ref 70–99)
HBA1C MFR BLD: 8.4 % (ref 0–5.6)
HCO3 SERPL-SCNC: 26 MMOL/L (ref 22–29)
HDLC SERPL-MCNC: 37 MG/DL
LDLC SERPL CALC-MCNC: 134 MG/DL
NONHDLC SERPL-MCNC: 168 MG/DL
POTASSIUM SERPL-SCNC: 4.3 MMOL/L (ref 3.4–5.3)
PROT SERPL-MCNC: 7.9 G/DL (ref 6.4–8.3)
SODIUM SERPL-SCNC: 138 MMOL/L (ref 135–145)
TRIGL SERPL-MCNC: 172 MG/DL

## 2024-11-21 RX ORDER — METFORMIN HYDROCHLORIDE 500 MG/1
500 TABLET, EXTENDED RELEASE ORAL
Qty: 90 TABLET | Refills: 1 | Status: SHIPPED | OUTPATIENT
Start: 2024-11-21

## 2024-11-21 NOTE — PROGRESS NOTES
Assessment & Plan   The longitudinal plan of care for the diagnosis(es)/condition(s) as documented were addressed during this visit. Due to the added complexity in care, I will continue to support Nate in the subsequent management and with ongoing continuity of care.    Type 2 diabetes mellitus without complication, without long-term current use of insulin (H)  Patient is here to recheck his labs related to his diabetes.  Refilled his medications for 6 months.  Patient will need to be seen in person in 6 months for diabetes check if his hemoglobin A1c is less than 8.  If it is above 8 he will need to be seen in 3 months.    - HEMOGLOBIN A1C; Future  - Lipid panel reflex to direct LDL Non-fasting; Future  - metFORMIN (GLUCOPHAGE XR) 500 MG 24 hr tablet; Take 1 tablet (500 mg) by mouth daily (with dinner).  - Comprehensive metabolic panel (BMP + Alb, Alk Phos, ALT, AST, Total. Bili, TP); Future  - HEMOGLOBIN A1C  - Lipid panel reflex to direct LDL Non-fasting  - Comprehensive metabolic panel (BMP + Alb, Alk Phos, ALT, AST, Total. Bili, TP)    Essential hypertension  Good control of blood pressure at this point in time with no new concerns.  - Comprehensive metabolic panel (BMP + Alb, Alk Phos, ALT, AST, Total. Bili, TP); Future  - Comprehensive metabolic panel (BMP + Alb, Alk Phos, ALT, AST, Total. Bili, TP)    Morbid obesity (H)  Still problematic.  Strongly recommended getting back into the gym and watching what he eats.  Follow-up with me as needed.  - Comprehensive metabolic panel (BMP + Alb, Alk Phos, ALT, AST, Total. Bili, TP); Future  - Comprehensive metabolic panel (BMP + Alb, Alk Phos, ALT, AST, Total. Bili, TP)    Hyperlipidemia LDL goal <100  Goal set, labs pending.  - Comprehensive metabolic panel (BMP + Alb, Alk Phos, ALT, AST, Total. Bili, TP); Future  - Comprehensive metabolic panel (BMP + Alb, Alk Phos, ALT, AST, Total. Bili, TP)          BMI  Estimated body mass index is 39.46 kg/m  as calculated  "from the following:    Height as of this encounter: 1.794 m (5' 10.63\").    Weight as of this encounter: 127 kg (280 lb).   Weight management plan: Discussed healthy diet and exercise guidelines      Work on weight loss  Regular exercise    Emmanuel Sullivan is a 39 year old, presenting for the following health issues:  Hypertension      11/21/2024    12:53 PM   Additional Questions   Roomed by Radha SHANKS   Accompanied by self     History of Present Illness       Hypertension: He presents for follow up of hypertension.  He does not check blood pressure  regularly outside of the clinic. Outpatient blood pressures have not been over 140/90. He does not follow a low salt diet.     He eats 0-1 servings of fruits and vegetables daily.He consumes 1 sweetened beverage(s) daily.He exercises with enough effort to increase his heart rate 10 to 19 minutes per day.  He exercises with enough effort to increase his heart rate 3 or less days per week. He is missing 3 dose(s) of medications per week.           Review of Systems  Constitutional, HEENT, cardiovascular, pulmonary, GI, , musculoskeletal, neuro, skin, endocrine and psych systems are negative, except as otherwise noted.      Objective    /82   Pulse 87   Temp 97.4  F (36.3  C) (Temporal)   Resp 18   Ht 1.794 m (5' 10.63\")   Wt 127 kg (280 lb)   SpO2 98%   BMI 39.46 kg/m    Body mass index is 39.46 kg/m .  Physical Exam   GENERAL: alert and no distress  NECK: no adenopathy, no asymmetry, masses, or scars  RESP: lungs clear to auscultation - no rales, rhonchi or wheezes  CV: regular rate and rhythm, normal S1 S2, no S3 or S4, no murmur, click or rub, no peripheral edema  ABDOMEN: soft, nontender, no hepatosplenomegaly, no masses and bowel sounds normal  MS: no gross musculoskeletal defects noted, no edema    No results found for this or any previous visit (from the past 24 hours).        Signed Electronically by: Pablo Vidal PA-C    "

## 2024-12-01 ENCOUNTER — HEALTH MAINTENANCE LETTER (OUTPATIENT)
Age: 39
End: 2024-12-01

## 2025-03-15 ENCOUNTER — HEALTH MAINTENANCE LETTER (OUTPATIENT)
Age: 40
End: 2025-03-15

## 2025-05-19 ENCOUNTER — PATIENT OUTREACH (OUTPATIENT)
Dept: CARE COORDINATION | Facility: CLINIC | Age: 40
End: 2025-05-19
Payer: COMMERCIAL

## 2025-06-17 DIAGNOSIS — E11.9 TYPE 2 DIABETES MELLITUS WITHOUT COMPLICATION, WITHOUT LONG-TERM CURRENT USE OF INSULIN (H): ICD-10-CM

## 2025-06-17 RX ORDER — METFORMIN HYDROCHLORIDE 500 MG/1
500 TABLET, EXTENDED RELEASE ORAL
Qty: 90 TABLET | Refills: 0 | OUTPATIENT
Start: 2025-06-17

## 2025-06-28 ENCOUNTER — HEALTH MAINTENANCE LETTER (OUTPATIENT)
Age: 40
End: 2025-06-28

## (undated) DEVICE — SU VICRYL 4-0 PS-2 18" UND J496H

## (undated) DEVICE — POSITIONER PT PRONESAFE HEAD REST W/DERMAPROX INSERT 40599

## (undated) DEVICE — DRAPE MICROSCOPE EQUIP 48X120" 6130VL2

## (undated) DEVICE — SPONGE SURGIFOAM 12 1972

## (undated) DEVICE — SOL WATER IRRIG 1000ML BOTTLE 2F7114

## (undated) DEVICE — SU VICRYL 3-0 RB-1 18" J713D

## (undated) DEVICE — DRAPE IOBAN INCISE 23X17" 6650EZ

## (undated) DEVICE — NDL SPINAL 18GA 3.5" 405184

## (undated) DEVICE — PACK UNIVERSAL SPLIT 29131

## (undated) DEVICE — DRAPE SHEET REV FOLD 3/4 9349

## (undated) DEVICE — MIDAS REX DISSECTING TOOL  14MH30

## (undated) DEVICE — LINEN TOWEL PACK X5 5464

## (undated) DEVICE — ESU GROUND PAD UNIVERSAL W/O CORD

## (undated) DEVICE — ESU ELEC BLADE 6" COATED/INSULATED E1455-6

## (undated) DEVICE — GLOVE PROTEXIS BLUE W/NEU-THERA 8.0  2D73EB80

## (undated) DEVICE — PACK SPINE SM CUSTOM SNE15SSFSK

## (undated) DEVICE — CUP AND LID 2PK 2OZ STERILE  SSK9006A

## (undated) DEVICE — PAD CHUX UNDERPAD 23X24" 7136

## (undated) DEVICE — GLOVE PROTEXIS BLUE W/NEU-THERA 8.5  2D73EB85

## (undated) DEVICE — KIT ENDO FIRST STEP DISINFECTANT 200ML W/POUCH EP-4

## (undated) DEVICE — SUCTION MINISQUAIR SMOKE EVAC CAPTURE DEVICE SQ20012-01

## (undated) DEVICE — PREP DURAPREP 26ML APL 8630

## (undated) DEVICE — DRAPE COVER C-ARM SEAMLESS SNAP-KAP 03-KP26 LATEX FREE

## (undated) DEVICE — GLOVE PROTEXIS W/NEU-THERA 8.5  2D73TE85

## (undated) DEVICE — GOWN XXL 9575

## (undated) DEVICE — SUCTION MANIFOLD NEPTUNE SGL

## (undated) DEVICE — ESU PENCIL W/HOLSTER E2350H

## (undated) DEVICE — MIDAS REX DISSECTING TOOL 2.5MM  T12MH25

## (undated) DEVICE — PREP CHLORAPREP 26ML TINTED ORANGE  260815

## (undated) RX ORDER — PROPOFOL 10 MG/ML
INJECTION, EMULSION INTRAVENOUS
Status: DISPENSED
Start: 2020-03-04

## (undated) RX ORDER — LIDOCAINE HYDROCHLORIDE 10 MG/ML
INJECTION, SOLUTION EPIDURAL; INFILTRATION; INTRACAUDAL; PERINEURAL
Status: DISPENSED
Start: 2019-12-19

## (undated) RX ORDER — OXYCODONE HYDROCHLORIDE 5 MG/1
TABLET ORAL
Status: DISPENSED
Start: 2020-03-04

## (undated) RX ORDER — HYDROMORPHONE HYDROCHLORIDE 1 MG/ML
INJECTION, SOLUTION INTRAMUSCULAR; INTRAVENOUS; SUBCUTANEOUS
Status: DISPENSED
Start: 2020-03-04

## (undated) RX ORDER — LIDOCAINE HYDROCHLORIDE 20 MG/ML
INJECTION, SOLUTION EPIDURAL; INFILTRATION; INTRACAUDAL; PERINEURAL
Status: DISPENSED
Start: 2020-03-04

## (undated) RX ORDER — DEXAMETHASONE SODIUM PHOSPHATE 4 MG/ML
INJECTION, SOLUTION INTRA-ARTICULAR; INTRALESIONAL; INTRAMUSCULAR; INTRAVENOUS; SOFT TISSUE
Status: DISPENSED
Start: 2020-03-04

## (undated) RX ORDER — DEXAMETHASONE SODIUM PHOSPHATE 10 MG/ML
INJECTION, SOLUTION INTRAMUSCULAR; INTRAVENOUS
Status: DISPENSED
Start: 2020-01-24

## (undated) RX ORDER — NEOSTIGMINE METHYLSULFATE 1 MG/ML
VIAL (ML) INJECTION
Status: DISPENSED
Start: 2020-03-04

## (undated) RX ORDER — FENTANYL CITRATE 50 UG/ML
INJECTION, SOLUTION INTRAMUSCULAR; INTRAVENOUS
Status: DISPENSED
Start: 2020-03-04

## (undated) RX ORDER — BUPIVACAINE HYDROCHLORIDE AND EPINEPHRINE 2.5; 5 MG/ML; UG/ML
INJECTION, SOLUTION EPIDURAL; INFILTRATION; INTRACAUDAL; PERINEURAL
Status: DISPENSED
Start: 2020-03-04

## (undated) RX ORDER — DEXAMETHASONE SODIUM PHOSPHATE 10 MG/ML
INJECTION, SOLUTION INTRAMUSCULAR; INTRAVENOUS
Status: DISPENSED
Start: 2019-12-19

## (undated) RX ORDER — GLYCOPYRROLATE 0.2 MG/ML
INJECTION, SOLUTION INTRAMUSCULAR; INTRAVENOUS
Status: DISPENSED
Start: 2020-03-04

## (undated) RX ORDER — FENTANYL CITRATE 50 UG/ML
INJECTION, SOLUTION INTRAMUSCULAR; INTRAVENOUS
Status: DISPENSED
Start: 2023-11-09

## (undated) RX ORDER — LIDOCAINE HYDROCHLORIDE 10 MG/ML
INJECTION, SOLUTION EPIDURAL; INFILTRATION; INTRACAUDAL; PERINEURAL
Status: DISPENSED
Start: 2020-01-24

## (undated) RX ORDER — CEFAZOLIN SODIUM 1 G/3ML
INJECTION, POWDER, FOR SOLUTION INTRAMUSCULAR; INTRAVENOUS
Status: DISPENSED
Start: 2020-03-04

## (undated) RX ORDER — ONDANSETRON 2 MG/ML
INJECTION INTRAMUSCULAR; INTRAVENOUS
Status: DISPENSED
Start: 2020-03-04